# Patient Record
Sex: MALE | Race: BLACK OR AFRICAN AMERICAN | NOT HISPANIC OR LATINO | URBAN - METROPOLITAN AREA
[De-identification: names, ages, dates, MRNs, and addresses within clinical notes are randomized per-mention and may not be internally consistent; named-entity substitution may affect disease eponyms.]

---

## 2017-01-13 ENCOUNTER — OUTPATIENT (OUTPATIENT)
Dept: OUTPATIENT SERVICES | Facility: HOSPITAL | Age: 52
LOS: 1 days | End: 2017-01-13
Payer: MEDICAID

## 2017-01-13 ENCOUNTER — APPOINTMENT (OUTPATIENT)
Dept: NUCLEAR MEDICINE | Facility: HOSPITAL | Age: 52
End: 2017-01-13

## 2017-01-13 DIAGNOSIS — Z96.651 PRESENCE OF RIGHT ARTIFICIAL KNEE JOINT: Chronic | ICD-10-CM

## 2017-01-13 DIAGNOSIS — C64.9 MALIGNANT NEOPLASM OF UNSPECIFIED KIDNEY, EXCEPT RENAL PELVIS: ICD-10-CM

## 2017-01-13 DIAGNOSIS — Z12.11 ENCOUNTER FOR SCREENING FOR MALIGNANT NEOPLASM OF COLON: Chronic | ICD-10-CM

## 2017-01-13 DIAGNOSIS — C77.9 SECONDARY AND UNSPECIFIED MALIGNANT NEOPLASM OF LYMPH NODE, UNSPECIFIED: ICD-10-CM

## 2017-01-13 DIAGNOSIS — C78.00 SECONDARY MALIGNANT NEOPLASM OF UNSPECIFIED LUNG: ICD-10-CM

## 2017-01-13 PROCEDURE — 78306 BONE IMAGING WHOLE BODY: CPT

## 2017-01-13 PROCEDURE — A9503: CPT

## 2017-04-10 ENCOUNTER — APPOINTMENT (OUTPATIENT)
Dept: CT IMAGING | Facility: HOSPITAL | Age: 52
End: 2017-04-10

## 2017-04-10 ENCOUNTER — OUTPATIENT (OUTPATIENT)
Dept: OUTPATIENT SERVICES | Facility: HOSPITAL | Age: 52
LOS: 1 days | End: 2017-04-10
Payer: MEDICAID

## 2017-04-10 DIAGNOSIS — C64.9 MALIGNANT NEOPLASM OF UNSPECIFIED KIDNEY, EXCEPT RENAL PELVIS: ICD-10-CM

## 2017-04-10 DIAGNOSIS — C78.00 SECONDARY MALIGNANT NEOPLASM OF UNSPECIFIED LUNG: ICD-10-CM

## 2017-04-10 DIAGNOSIS — Z96.651 PRESENCE OF RIGHT ARTIFICIAL KNEE JOINT: Chronic | ICD-10-CM

## 2017-04-10 DIAGNOSIS — Z12.11 ENCOUNTER FOR SCREENING FOR MALIGNANT NEOPLASM OF COLON: Chronic | ICD-10-CM

## 2017-04-10 DIAGNOSIS — C77.9 SECONDARY AND UNSPECIFIED MALIGNANT NEOPLASM OF LYMPH NODE, UNSPECIFIED: ICD-10-CM

## 2017-04-10 PROCEDURE — 71260 CT THORAX DX C+: CPT

## 2017-04-10 PROCEDURE — 74177 CT ABD & PELVIS W/CONTRAST: CPT

## 2017-07-17 ENCOUNTER — APPOINTMENT (OUTPATIENT)
Dept: NUCLEAR MEDICINE | Facility: HOSPITAL | Age: 52
End: 2017-07-17

## 2017-07-17 ENCOUNTER — OUTPATIENT (OUTPATIENT)
Dept: OUTPATIENT SERVICES | Facility: HOSPITAL | Age: 52
LOS: 1 days | End: 2017-07-17
Payer: MEDICAID

## 2017-07-17 DIAGNOSIS — C64.9 MALIGNANT NEOPLASM OF UNSPECIFIED KIDNEY, EXCEPT RENAL PELVIS: ICD-10-CM

## 2017-07-17 DIAGNOSIS — Z96.651 PRESENCE OF RIGHT ARTIFICIAL KNEE JOINT: Chronic | ICD-10-CM

## 2017-07-17 DIAGNOSIS — C79.51 SECONDARY MALIGNANT NEOPLASM OF BONE: ICD-10-CM

## 2017-07-17 DIAGNOSIS — Z12.11 ENCOUNTER FOR SCREENING FOR MALIGNANT NEOPLASM OF COLON: Chronic | ICD-10-CM

## 2017-07-17 DIAGNOSIS — C78.00 SECONDARY MALIGNANT NEOPLASM OF UNSPECIFIED LUNG: ICD-10-CM

## 2017-07-17 PROCEDURE — A9503: CPT

## 2017-07-17 PROCEDURE — 78306 BONE IMAGING WHOLE BODY: CPT

## 2017-07-24 ENCOUNTER — APPOINTMENT (OUTPATIENT)
Dept: CT IMAGING | Facility: HOSPITAL | Age: 52
End: 2017-07-24

## 2017-07-24 ENCOUNTER — OUTPATIENT (OUTPATIENT)
Dept: OUTPATIENT SERVICES | Facility: HOSPITAL | Age: 52
LOS: 1 days | End: 2017-07-24
Payer: MEDICAID

## 2017-07-24 DIAGNOSIS — C64.9 MALIGNANT NEOPLASM OF UNSPECIFIED KIDNEY, EXCEPT RENAL PELVIS: ICD-10-CM

## 2017-07-24 DIAGNOSIS — Z96.651 PRESENCE OF RIGHT ARTIFICIAL KNEE JOINT: Chronic | ICD-10-CM

## 2017-07-24 DIAGNOSIS — C78.00 SECONDARY MALIGNANT NEOPLASM OF UNSPECIFIED LUNG: ICD-10-CM

## 2017-07-24 DIAGNOSIS — C79.51 SECONDARY MALIGNANT NEOPLASM OF BONE: ICD-10-CM

## 2017-07-24 DIAGNOSIS — Z12.11 ENCOUNTER FOR SCREENING FOR MALIGNANT NEOPLASM OF COLON: Chronic | ICD-10-CM

## 2017-07-24 PROCEDURE — 74177 CT ABD & PELVIS W/CONTRAST: CPT | Mod: 26

## 2017-07-24 PROCEDURE — 71250 CT THORAX DX C-: CPT | Mod: 26

## 2017-07-24 PROCEDURE — 74177 CT ABD & PELVIS W/CONTRAST: CPT

## 2017-07-24 PROCEDURE — 71250 CT THORAX DX C-: CPT

## 2017-08-23 ENCOUNTER — APPOINTMENT (OUTPATIENT)
Dept: ORTHOPEDIC SURGERY | Facility: CLINIC | Age: 52
End: 2017-08-23
Payer: COMMERCIAL

## 2017-08-23 VITALS — SYSTOLIC BLOOD PRESSURE: 162 MMHG | HEART RATE: 70 BPM | DIASTOLIC BLOOD PRESSURE: 100 MMHG

## 2017-08-23 VITALS — BODY MASS INDEX: 33.6 KG/M2 | WEIGHT: 240 LBS | HEIGHT: 71 IN

## 2017-08-23 DIAGNOSIS — C64.9 MALIGNANT NEOPLASM OF UNSPECIFIED KIDNEY, EXCEPT RENAL PELVIS: ICD-10-CM

## 2017-08-23 PROCEDURE — 99203 OFFICE O/P NEW LOW 30 MIN: CPT

## 2018-04-20 ENCOUNTER — APPOINTMENT (OUTPATIENT)
Dept: CT IMAGING | Facility: HOSPITAL | Age: 53
End: 2018-04-20
Payer: COMMERCIAL

## 2018-04-20 ENCOUNTER — OUTPATIENT (OUTPATIENT)
Dept: OUTPATIENT SERVICES | Facility: HOSPITAL | Age: 53
LOS: 1 days | End: 2018-04-20
Payer: MEDICAID

## 2018-04-20 DIAGNOSIS — Z12.11 ENCOUNTER FOR SCREENING FOR MALIGNANT NEOPLASM OF COLON: Chronic | ICD-10-CM

## 2018-04-20 DIAGNOSIS — C64.9 MALIGNANT NEOPLASM OF UNSPECIFIED KIDNEY, EXCEPT RENAL PELVIS: ICD-10-CM

## 2018-04-20 DIAGNOSIS — Z96.651 PRESENCE OF RIGHT ARTIFICIAL KNEE JOINT: Chronic | ICD-10-CM

## 2018-04-20 PROCEDURE — 74177 CT ABD & PELVIS W/CONTRAST: CPT | Mod: 26

## 2018-04-20 PROCEDURE — 74177 CT ABD & PELVIS W/CONTRAST: CPT

## 2018-05-29 ENCOUNTER — FORM ENCOUNTER (OUTPATIENT)
Age: 53
End: 2018-05-29

## 2018-05-30 ENCOUNTER — APPOINTMENT (OUTPATIENT)
Dept: ENDOVASCULAR SURGERY | Facility: CLINIC | Age: 53
End: 2018-05-30
Payer: COMMERCIAL

## 2018-05-30 PROCEDURE — 36561 INSERT TUNNELED CV CATH: CPT | Mod: LT

## 2018-05-30 PROCEDURE — 76937 US GUIDE VASCULAR ACCESS: CPT

## 2018-05-30 PROCEDURE — 77001 FLUOROGUIDE FOR VEIN DEVICE: CPT

## 2018-06-01 ENCOUNTER — OUTPATIENT (OUTPATIENT)
Dept: OUTPATIENT SERVICES | Facility: HOSPITAL | Age: 53
LOS: 1 days | End: 2018-06-01
Payer: MEDICAID

## 2018-06-01 DIAGNOSIS — Z96.651 PRESENCE OF RIGHT ARTIFICIAL KNEE JOINT: Chronic | ICD-10-CM

## 2018-06-01 DIAGNOSIS — Z12.11 ENCOUNTER FOR SCREENING FOR MALIGNANT NEOPLASM OF COLON: Chronic | ICD-10-CM

## 2018-06-01 PROCEDURE — G9001: CPT

## 2018-06-06 ENCOUNTER — APPOINTMENT (OUTPATIENT)
Dept: RADIOLOGY | Facility: HOSPITAL | Age: 53
End: 2018-06-06
Payer: COMMERCIAL

## 2018-06-06 ENCOUNTER — OUTPATIENT (OUTPATIENT)
Dept: OUTPATIENT SERVICES | Facility: HOSPITAL | Age: 53
LOS: 1 days | End: 2018-06-06

## 2018-06-06 ENCOUNTER — APPOINTMENT (OUTPATIENT)
Dept: SPEECH THERAPY | Facility: HOSPITAL | Age: 53
End: 2018-06-06
Payer: COMMERCIAL

## 2018-06-06 ENCOUNTER — OUTPATIENT (OUTPATIENT)
Dept: OUTPATIENT SERVICES | Facility: HOSPITAL | Age: 53
LOS: 1 days | Discharge: HOME | End: 2018-06-06

## 2018-06-06 DIAGNOSIS — Z12.11 ENCOUNTER FOR SCREENING FOR MALIGNANT NEOPLASM OF COLON: Chronic | ICD-10-CM

## 2018-06-06 DIAGNOSIS — R13.12 DYSPHAGIA, OROPHARYNGEAL PHASE: ICD-10-CM

## 2018-06-06 DIAGNOSIS — R13.10 DYSPHAGIA, UNSPECIFIED: ICD-10-CM

## 2018-06-06 DIAGNOSIS — Z96.651 PRESENCE OF RIGHT ARTIFICIAL KNEE JOINT: Chronic | ICD-10-CM

## 2018-06-06 PROCEDURE — 74230 X-RAY XM SWLNG FUNCJ C+: CPT | Mod: 26

## 2018-06-18 DIAGNOSIS — R69 ILLNESS, UNSPECIFIED: ICD-10-CM

## 2018-07-01 ENCOUNTER — OUTPATIENT (OUTPATIENT)
Dept: OUTPATIENT SERVICES | Facility: HOSPITAL | Age: 53
LOS: 1 days | End: 2018-07-01

## 2018-07-01 DIAGNOSIS — Z12.11 ENCOUNTER FOR SCREENING FOR MALIGNANT NEOPLASM OF COLON: Chronic | ICD-10-CM

## 2018-07-01 DIAGNOSIS — Z96.651 PRESENCE OF RIGHT ARTIFICIAL KNEE JOINT: Chronic | ICD-10-CM

## 2018-07-23 DIAGNOSIS — Z71.89 OTHER SPECIFIED COUNSELING: ICD-10-CM

## 2018-08-02 ENCOUNTER — INPATIENT (INPATIENT)
Facility: HOSPITAL | Age: 53
LOS: 4 days | Discharge: NURSING, REHAB OR LONG-TERM CARE | DRG: 686 | End: 2018-08-07
Attending: STUDENT IN AN ORGANIZED HEALTH CARE EDUCATION/TRAINING PROGRAM | Admitting: STUDENT IN AN ORGANIZED HEALTH CARE EDUCATION/TRAINING PROGRAM
Payer: COMMERCIAL

## 2018-08-02 VITALS
SYSTOLIC BLOOD PRESSURE: 94 MMHG | OXYGEN SATURATION: 95 % | TEMPERATURE: 98 F | HEART RATE: 90 BPM | DIASTOLIC BLOOD PRESSURE: 62 MMHG | RESPIRATION RATE: 25 BRPM

## 2018-08-02 DIAGNOSIS — Z91.89 OTHER SPECIFIED PERSONAL RISK FACTORS, NOT ELSEWHERE CLASSIFIED: ICD-10-CM

## 2018-08-02 DIAGNOSIS — C64.1 MALIGNANT NEOPLASM OF RIGHT KIDNEY, EXCEPT RENAL PELVIS: ICD-10-CM

## 2018-08-02 DIAGNOSIS — S72.90XA UNSPECIFIED FRACTURE OF UNSPECIFIED FEMUR, INITIAL ENCOUNTER FOR CLOSED FRACTURE: Chronic | ICD-10-CM

## 2018-08-02 DIAGNOSIS — Z96.653 PRESENCE OF ARTIFICIAL KNEE JOINT, BILATERAL: Chronic | ICD-10-CM

## 2018-08-02 DIAGNOSIS — R63.8 OTHER SYMPTOMS AND SIGNS CONCERNING FOOD AND FLUID INTAKE: ICD-10-CM

## 2018-08-02 DIAGNOSIS — I95.9 HYPOTENSION, UNSPECIFIED: ICD-10-CM

## 2018-08-02 DIAGNOSIS — Z12.11 ENCOUNTER FOR SCREENING FOR MALIGNANT NEOPLASM OF COLON: Chronic | ICD-10-CM

## 2018-08-02 DIAGNOSIS — Z96.651 PRESENCE OF RIGHT ARTIFICIAL KNEE JOINT: Chronic | ICD-10-CM

## 2018-08-02 DIAGNOSIS — R56.9 UNSPECIFIED CONVULSIONS: ICD-10-CM

## 2018-08-02 DIAGNOSIS — D64.9 ANEMIA, UNSPECIFIED: ICD-10-CM

## 2018-08-02 LAB
ALBUMIN SERPL ELPH-MCNC: 1.8 G/DL — LOW (ref 3.3–5)
ALP SERPL-CCNC: 354 U/L — HIGH (ref 40–120)
ALT FLD-CCNC: <5 U/L — LOW (ref 10–45)
ANION GAP SERPL CALC-SCNC: 16 MMOL/L — SIGNIFICANT CHANGE UP (ref 5–17)
APTT BLD: 40.7 SEC — HIGH (ref 27.5–37.4)
AST SERPL-CCNC: 8 U/L — LOW (ref 10–40)
BASOPHILS NFR BLD AUTO: 0.1 % — SIGNIFICANT CHANGE UP (ref 0–2)
BILIRUB SERPL-MCNC: 0.8 MG/DL — SIGNIFICANT CHANGE UP (ref 0.2–1.2)
BLD GP AB SCN SERPL QL: NEGATIVE — SIGNIFICANT CHANGE UP
BUN SERPL-MCNC: 11 MG/DL — SIGNIFICANT CHANGE UP (ref 7–23)
CALCIUM SERPL-MCNC: 7.8 MG/DL — LOW (ref 8.4–10.5)
CHLORIDE SERPL-SCNC: 98 MMOL/L — SIGNIFICANT CHANGE UP (ref 96–108)
CO2 SERPL-SCNC: 23 MMOL/L — SIGNIFICANT CHANGE UP (ref 22–31)
CREAT SERPL-MCNC: 0.68 MG/DL — SIGNIFICANT CHANGE UP (ref 0.5–1.3)
EOSINOPHIL NFR BLD AUTO: 2.4 % — SIGNIFICANT CHANGE UP (ref 0–6)
FERRITIN SERPL-MCNC: 3794 NG/ML — HIGH (ref 30–400)
GLUCOSE SERPL-MCNC: 84 MG/DL — SIGNIFICANT CHANGE UP (ref 70–99)
HCT VFR BLD CALC: 26 % — LOW (ref 39–50)
HGB BLD-MCNC: 7.8 G/DL — LOW (ref 13–17)
INR BLD: 1.61 — HIGH (ref 0.88–1.16)
IRON SATN MFR SERPL: 20 UG/DL — LOW (ref 45–165)
IRON SATN MFR SERPL: 29 % — SIGNIFICANT CHANGE UP (ref 16–55)
LYMPHOCYTES # BLD AUTO: 10.4 % — LOW (ref 13–44)
MAGNESIUM SERPL-MCNC: 1.8 MG/DL — SIGNIFICANT CHANGE UP (ref 1.6–2.6)
MCHC RBC-ENTMCNC: 26.4 PG — LOW (ref 27–34)
MCHC RBC-ENTMCNC: 30 G/DL — LOW (ref 32–36)
MCV RBC AUTO: 87.8 FL — SIGNIFICANT CHANGE UP (ref 80–100)
MONOCYTES NFR BLD AUTO: 10.2 % — SIGNIFICANT CHANGE UP (ref 2–14)
NEUTROPHILS NFR BLD AUTO: 76.9 % — SIGNIFICANT CHANGE UP (ref 43–77)
PLATELET # BLD AUTO: 318 K/UL — SIGNIFICANT CHANGE UP (ref 150–400)
POTASSIUM SERPL-MCNC: 4.1 MMOL/L — SIGNIFICANT CHANGE UP (ref 3.5–5.3)
POTASSIUM SERPL-SCNC: 4.1 MMOL/L — SIGNIFICANT CHANGE UP (ref 3.5–5.3)
PROT SERPL-MCNC: 7.1 G/DL — SIGNIFICANT CHANGE UP (ref 6–8.3)
PROTHROM AB SERPL-ACNC: 18.1 SEC — HIGH (ref 9.8–12.7)
RBC # BLD: 2.96 M/UL — LOW (ref 4.2–5.8)
RBC # FLD: 15.2 % — SIGNIFICANT CHANGE UP (ref 10.3–16.9)
RH IG SCN BLD-IMP: POSITIVE — SIGNIFICANT CHANGE UP
SODIUM SERPL-SCNC: 137 MMOL/L — SIGNIFICANT CHANGE UP (ref 135–145)
TIBC SERPL-MCNC: 70 UG/DL — LOW (ref 220–430)
TSH SERPL-MCNC: 2.76 UIU/ML — SIGNIFICANT CHANGE UP (ref 0.35–4.94)
UIBC SERPL-MCNC: 50 UG/DL — LOW (ref 110–370)
WBC # BLD: 8.7 K/UL — SIGNIFICANT CHANGE UP (ref 3.8–10.5)
WBC # FLD AUTO: 8.7 K/UL — SIGNIFICANT CHANGE UP (ref 3.8–10.5)

## 2018-08-02 PROCEDURE — 99223 1ST HOSP IP/OBS HIGH 75: CPT | Mod: GC

## 2018-08-02 PROCEDURE — 71045 X-RAY EXAM CHEST 1 VIEW: CPT | Mod: 26

## 2018-08-02 PROCEDURE — 99285 EMERGENCY DEPT VISIT HI MDM: CPT

## 2018-08-02 PROCEDURE — 70450 CT HEAD/BRAIN W/O DYE: CPT | Mod: 26

## 2018-08-02 RX ORDER — ACETAMINOPHEN 500 MG
1000 TABLET ORAL ONCE
Qty: 0 | Refills: 0 | Status: COMPLETED | OUTPATIENT
Start: 2018-08-02 | End: 2018-08-02

## 2018-08-02 RX ORDER — MORPHINE SULFATE 50 MG/1
4 CAPSULE, EXTENDED RELEASE ORAL ONCE
Qty: 0 | Refills: 0 | Status: DISCONTINUED | OUTPATIENT
Start: 2018-08-02 | End: 2018-08-02

## 2018-08-02 RX ORDER — SODIUM CHLORIDE 9 MG/ML
1000 INJECTION INTRAMUSCULAR; INTRAVENOUS; SUBCUTANEOUS ONCE
Qty: 0 | Refills: 0 | Status: COMPLETED | OUTPATIENT
Start: 2018-08-02 | End: 2018-08-02

## 2018-08-02 RX ORDER — MORPHINE SULFATE 50 MG/1
2 CAPSULE, EXTENDED RELEASE ORAL EVERY 4 HOURS
Qty: 0 | Refills: 0 | Status: DISCONTINUED | OUTPATIENT
Start: 2018-08-03 | End: 2018-08-03

## 2018-08-02 RX ADMIN — Medication 1000 MILLIGRAM(S): at 20:56

## 2018-08-02 RX ADMIN — Medication 400 MILLIGRAM(S): at 15:18

## 2018-08-02 RX ADMIN — Medication 400 MILLIGRAM(S): at 20:14

## 2018-08-02 RX ADMIN — MORPHINE SULFATE 4 MILLIGRAM(S): 50 CAPSULE, EXTENDED RELEASE ORAL at 21:29

## 2018-08-02 RX ADMIN — SODIUM CHLORIDE 1000 MILLILITER(S): 9 INJECTION INTRAMUSCULAR; INTRAVENOUS; SUBCUTANEOUS at 13:42

## 2018-08-02 RX ADMIN — Medication 1000 MILLIGRAM(S): at 16:00

## 2018-08-02 NOTE — H&P ADULT - NSHPLABSRESULTS_GEN_ALL_CORE
(08-02 @ 13:43)                      7.8  8.7 )-----------( 318                 26.0    Neutrophils = -- (76.9%)  Lymphocytes = -- (10.4%)  Eosinophils = -- (2.4%)  Basophils = -- (0.1%)  Monocytes = -- (10.2%)  Bands = --%    08-02    137  |  98  |  11  ----------------------------<  84  4.1   |  23  |  0.68    Ca    7.8<L>      02 Aug 2018 13:43  Mg     1.8     08-02    TPro  7.1  /  Alb  1.8<L>  /  TBili  0.8  /  DBili  x   /  AST  8<L>  /  ALT  <5<L>  /  AlkPhos  354<H>  08-02    ( 02 Aug 2018 13:43 )   PT: 18.1 sec;   INR: 1.61 ;       PTT:40.7 sec    EKG: normal sinus rhythm     CT head: nonspecific patchy areas of low attenuation in the cerebral white matter. There is no acute intracranial hemorrhage, midline shift, mass   effect or extracerebral collection, cerebral volume loss

## 2018-08-02 NOTE — ED PROVIDER NOTE - OBJECTIVE STATEMENT
53 year old male with history of metastatic renal carcinoma presents to ED via EMS transport with concern for hypotension during chemotherapy today.  Patient is awake and alert on ED arrival, able to provide his own history.  He notes ongoing bilateral hip discomfort which he attributes to osteoarthiritis, however is asymptomatic otherwise.  He denies complaints of lightheadedness, fever, chills, chest pain, nausea, vomiting, abdominal pain or any additional acute complaints or concerns at this time. 53 year old male with history of metastatic renal carcinoma presents to ED via EMS transport with concern for hypotension during chemotherapy today.  Patient is awake and alert on ED arrival, able to provide his own history.  He notes ongoing bilateral hip discomfort which he attributes to osteoarthritis, however is asymptomatic otherwise.  He denies complaints of lightheadedness, fever, chills, chest pain, nausea, vomiting, abdominal pain or any additional acute complaints or concerns at this time.

## 2018-08-02 NOTE — ED PROVIDER NOTE - MEDICAL DECISION MAKING DETAILS
patient in ED sent by PCP for admission.  Patient with history of renal cell carcinoma and seizure last week.  PCP notes brain mets and is concerned for no prior work up for the seizure last week.  She also feels care has been suboptimal at SNF as per ED note sent with patient.  Labs and imaging reviewed.  Hgb noted.  Patient given 1 L IVF and ICU in ED to evaluate for possible tele admission.  ICU does not feel patient requires tele admit at this time as his BP is stable.  Recs made for possible transfusion of pRBCs on admission.  Will relay this rec to admitting medicine team.  Patient aware of plan for admission and agreeable.

## 2018-08-02 NOTE — H&P ADULT - PROBLEM SELECTOR PLAN 3
CT head showing nonspecific patchy areas of low attenuation in the cerebral white matter.  History of RCC w/ mets to brain, s/p WBRT in August-Sept. 2017.  No significant electrolyte abnormalities on admission. BP 94/62 in ED. s/p 1 L NS. Per patient, BP usually runs low in SNF.   No signs of sepsis at this time: patient afebrile, WBC WNL    Transfuse 1 unit pRBCs and monitor BP. BP 94/62 in ED. s/p 1 L NS and 1 unit pRBCs. Per patient, BP usually runs low in SNF.   No signs of sepsis at this time: patient afebrile, WBC WNL    Monitor BP. Give IV fluids if hypotensive.   Obtain orthostatic vitals.

## 2018-08-02 NOTE — H&P ADULT - HISTORY OF PRESENT ILLNESS
53 year old male with history of metastatic RCC (diagnosed in 2016; mets to brain, lungs, femur bilaterally), currently living in SNF who present to [facility] ED from chemo center at the urge of his oncologist.    Patient was diagnosed with his RCC 2 years ago and has been treated with radiation to the brain in 2017 and has been back on chemo with Nivolumab every 2 weeks since May 2018.    Patient reports he has also been mostly bedbound because he had bilateral femoral surgeries in november 2017 at [facility] for bone mets w/ pathological fractures. He has been living in a SNF.    Patient reports this Sunday, 7/28/18, he was talking to this friend when he suddenly began twitching his RUE and LOC. Patient is unclear of how long he was out but endorses no tongue biting or urinary loss after episode. Patient has never had a seizure before.  Patient was not sent to a hospital. Patient when hospitalized usually goes to [facility] or [facility]. This past Tuesday was transfused 2 units of pRBCs by his oncologist for worsening anemia. Patient followed up with is oncologist (Dr. Grigsby) today for nivolomab infusion. However, his oncologist  did not feel comfortable giving him chemo and sent him to [facility] for further workup regarding seizure with concern that  he is getting substandard care at his facility and for "Hope that he can be enrolled in hospice."    In ER, patient's initial vitals were  T98.2, HR 90, BP 94/62, R 25, SpO2 95% RA. His only acute complaint was severe left hip pain, which he says is chronic and gets Tylenol at his SNF. He reports 200 pound weight loss since he was diagnosed, anorexia and chronic femur pain. ROS is negative for fevers, chills, headache, visual changes, SOB, cough, chest pain, runny nose, sore throat, abdominal pain, N/V/D or changes in urinary habits. Denies any hematochezia, hematemesis, melena, hematuria, or other signs of external bleeding. Reports having low blood pressure readings in the past few months 53 year old male with history of metastatic RCC (diagnosed in 2016; mets to brain, lungs, femur bilaterally), currently living in SNF who present to [facility] ED from chemo center at the urge of his oncologist.    Patient was diagnosed with his RCC 2 years ago and has been treated with radiation to the brain in 2017 and has been back on chemo with Nivolumab every 2 weeks since May 2018.    Patient reports he has also been mostly bedbound because he had bilateral femoral surgeries in november 2017 at [facility] for bone mets w/ pathological fractures. He has been living in a SNF.    Patient reports this Sunday, 7/28/18, he was talking to this friend when he suddenly began twitching in his RUE and was followed LOC. Patient is unclear of how long he was out but endorses no tongue biting or urinary loss after episode. Episode was unwitnessed, and patient unable to provide further details about event. Patient has never had a seizure before.  Patient was not sent to a hospital.     Patient when hospitalized usually goes to [place] or [facility]. This past Tuesday was transfused 2 units of pRBCs by his oncologist for worsening anemia. Patient followed up with is oncologist (Dr. Garcia) today for nivolomab infusion. However, his oncologist  did not feel comfortable giving him chemo and sent him to [facility] for further workup regarding seizure with concern that  he is getting substandard care at his facility and for "Hope that he can be enrolled in hospice."    In ER, patient's initial vitals were  T98.2, HR 90, BP 94/62, R 25, SpO2 95% RA. His only acute complaint was severe left hip pain, which he says is chronic and gets Tylenol at his SNF. He reports 200 pound weight loss since he was diagnosed, anorexia and chronic femur pain. ROS is negative for fevers, chills, headache, visual changes, SOB, cough, chest pain, runny nose, sore throat, abdominal pain, N/V/D or changes in urinary habits. Denies any hematochezia, hematemesis, melena, hematuria, or other signs of external bleeding. Reports having low blood pressure readings in the past few months 53 year old male with history of metastatic RCC (diagnosed in 2016; mets to brain, lungs, femur bilaterally), currently living in SNF who present to [facility] ED from chemo center at the urge of his oncologist.    Patient was diagnosed with his RCC 2 years ago and has been treated with radiation to the brain in 2017 and has been back on chemo with Nivolumab every 2 weeks since May 2018.    Patient reports he has also been mostly bedbound because he had bilateral femoral surgeries in november 2017 at [facility] for bone mets w/ pathological fractures. He has been living in a SNF.    Patient reports this Sunday, 7/28/18, he was talking to this friend when he suddenly began twitching in his RUE and was followed LOC. Patient is unclear of how long he was out but endorses no tongue biting or urinary loss after episode. Episode was unwitnessed, and patient unable to provide further details about event. Patient has never had a seizure before.  Patient was not sent to a hospital.     Patient when hospitalized usually goes to [place] or [facility]. This past Tuesday was transfused 2 units of pRBCs by his oncologist for worsening anemia. Patient followed up with is oncologist (Dr. Garcia) today for nivolomab infusion. However, his oncologist  did not feel comfortable giving him chemo and sent him to [facility] for further workup regarding seizure with concern that  he is getting substandard care at his facility and for "Hope that he can be enrolled in hospice."    In ER, patient's initial vitals were  T98.2, HR 90, BP 94/62, R 25, SpO2 95% RA. His only acute complaint was severe left hip pain, which he says is chronic and gets Tylenol at his SNF. He reports 200 pound weight loss since he was diagnosed, anorexia and chronic femur pain. ROS is negative for fevers, chills, headache, visual changes, SOB, cough, chest pain, runny nose, sore throat, abdominal pain, N/V/D or changes in urinary habits. Denies any hematochezia, hematemesis, melena, hematuria, or other signs of external bleeding. Reports having low blood pressure readings in the past few months     FH: Denies cardiac history in father

## 2018-08-02 NOTE — H&P ADULT - NSHPPHYSICALEXAM_GEN_ALL_CORE
GENERAL: Thin and cachectic. No acute distress. Appears stated age.  HEENT:  Atraumatic, Normocephalic,  extraocular eye movements intact, PERRLA, conjunctiva pallor bilaterally, oropharynx clear without exudates or erythema  NECK: Supple, No JVD, no LAD  CHEST: port in place in left chest   LUNG: Clear to auscultation bilaterally; No wheezing, rales, rhonchi, or stridor  HEART: Regular rate and rhythm; S1 and S2 audible; No murmurs, rubs, or gallops  ABDOMEN: Soft, Nontender, Nondistended; Bowel sounds present in all four quadrants; no hepatosplenomegaly  EXTREMITIES:  2+ Peripheral Pulses bilaterally, No clubbing, cyanosis. Trace edema in LLE.   PSYCH: AAOx3  NEUROLOGY: CNs II-XII intact; strength 5/5 in bilateral UEs and RLE; unable to assess LLE strength due to severe pain  SKIN: No rashes or lesions

## 2018-08-02 NOTE — H&P ADULT - NSHPSOCIALHISTORY_GEN_ALL_CORE
Denies current smoking, alcohol, or illicit drug use. Previously used alcohol heavily.   Lives in a SNF. Has 2 daughters.

## 2018-08-02 NOTE — H&P ADULT - ASSESSMENT
53 year old male with history of metastatic RCC (diagnosed in 2016; mets to brain, lungs, femur bilaterally), currently living in SNF who present to [facility] ED from [facility] center at the urge of his oncologist after recent seizure-like episode. Found to have borderline low BP in 90s/60s but stable, and anemia at 7.8 which appears to be near baseline with no signs of bleeding, though patient gets frequent transfusions. Given anemia and low BP, patient will be stabilized and transfused, and seizure work-up pending.

## 2018-08-02 NOTE — ED PROVIDER NOTE - CONSTITUTIONAL, MLM
normal... Thin, frail appearing, awake, alert, oriented to person, place, time/situation and in no apparent distress.

## 2018-08-02 NOTE — H&P ADULT - PROBLEM SELECTOR PLAN 6
Will update patient's outpatient providers on discharge. Code: Full  DVT ppx: SCDs; hold heparin due to anemia   GI ppx: pantoprazole  Dispo: admit to [facility] F: s/p 1 L NS and 1 unit pRBCs. Will get more fluids if hypotensive  E: no significant electrolyte abnormalities  N: regular diet; consult nutrition services     Code: Full  DVT ppx: enoxaparin SQ  GI ppx: pantoprazole  Dispo: admit to [facility]

## 2018-08-02 NOTE — CONSULT NOTE ADULT - SUBJECTIVE AND OBJECTIVE BOX
[facility] SERVICE CONSULTATION NOTE    CC:    HPI: Patient is a gentleman with a pmhx of metastatic RCC (diagnosed in 2016; mets to brain, lungs, femur bilaterally) who is here presenting from chemo center at the urge of his oncologist since oncologist feels he is getting substandard care at his facility. Patient reports he was diagnosed with his RCC 2 years ago and has been treated with radiation to the brain in 2017 and has been back on chemo since may every 2 weeks. Patient reports he has also been mostly bedbound since he had bilateral femoral surgeries in november for bone marrow spread. Patient reports this sunday he was talking to this friend when he suddenly began twitching his RUE and LOC. Patient is unclear of how long he was out but endorses no tongue biting or urinary loss after episode. Patient has never had a seizure before.  Patient was not sent to a hospital. Patient when hospitalized usually goes to [facility] or [facility]. This past Tuesday he was told his blood counts are low and was given 2 U of PRBC. Patient followed up with is oncologist today for chemo however Dr. Garcia did not feel comfortable giving him chemo and sent him to [facility] for further workup regarding seizure and for "Hope that he can be enrolled in hospice".  Patient currently has no acute complaints. ROS is positive for almost 200 pound weight loss since he was diagnosed, anorexia and chronic femur pain.  2 weeks ago. ROS is negative for fevers, chill, Headache, SOB, Cough, runny nose, sore throat, abdominal pain, N/V/D or changes in urinary habits .   PMH: see above; had HTN in the past    PSH: bilateral femur and knee replacements    SH: doesnt smoke cigarettes, drink alcohol or use     ALLERGIES: none    MEDICATIONS: unclear but takes nivolumab for chemio    VITAL SIGNS:  ICU Vital Signs Last 24 Hrs  T(C): 36.5 (02 Aug 2018 13:58), Max: 36.8 (02 Aug 2018 12:59)  T(F): 97.7 (02 Aug 2018 13:58), Max: 98.2 (02 Aug 2018 12:59)  HR: 90 (02 Aug 2018 15:24) (88 - 90)  BP: 89/55 (02 Aug 2018 15:24) (88/61 - 94/62)  BP(mean): --  ABP: --  ABP(mean): --  RR: 24 (02 Aug 2018 13:58) (24 - 25)  SpO2: 95% (02 Aug 2018 13:58) (95% - 95%)    CAPILLARY BLOOD GLUCOSE          PHYSICAL EXAM:  Constitutional: resting comfortably in bed, NAD, hollowed out cheeks  HEENT: NC/AT; PERRL, anicteric sclera; no oropharyngeal erythema or exudates; dry MM; conjunctivial pallor   Neck: supple, no appreciable JVD, port seen in place   Respiratory: CTA B/L, no W/R/R; respirations appear non-labored, conversive in full sentences  Cardiovascular: +S1/S2, RRR  Gastrointestinal: abdomen soft, NT/ND  Extremities: LLE edema 1+ increased as compared to the right   Vascular: 2+ radial and 1+ DP pulses B/L  Dermatologic: skin normal color and turgor; no visible rashes  Neurological: AAOX3, SAVEAHAART exam for attention normal; reflexes    in patellar and brachiradialis; no dysdidochokinesis;   LABS:                        7.8    8.7   )-----------( 318      ( 02 Aug 2018 13:43 )             26.0     08-02    137  |  98  |  11  ----------------------------<  84  4.1   |  23  |  0.68    Ca    7.8<L>      02 Aug 2018 13:43  Mg     1.8     08-02    TPro  7.1  /  Alb  1.8<L>  /  TBili  0.8  /  DBili  x   /  AST  8<L>  /  ALT  <5<L>  /  AlkPhos  354<H>  08-02    PT/INR - ( 02 Aug 2018 13:43 )   PT: 18.1 sec;   INR: 1.61          PTT - ( 02 Aug 2018 13:43 )  PTT:40.7 sec              EKG: NSR with no ST-T wave changes   RADIOLOGY & ADDITIONAL TESTS: CXR WNL       Patient is [facility] SERVICE CONSULTATION NOTE    CC:    HPI: Patient is a gentleman with a pmhx of metastatic RCC (diagnosed in 2016; mets to brain, lungs, femur bilaterally) who is here presenting from chemo center at the urge of his oncologist since oncologist feels he is getting substandard care at his facility. Patient reports he was diagnosed with his RCC 2 years ago and has been treated with radiation to the brain in 2017 and has been back on chemo since may every 2 weeks. Patient reports he has also been mostly bedbound since he had bilateral femoral surgeries in november for bone marrow spread. Patient reports this sunday he was talking to this friend when he suddenly began twitching his RUE and LOC. Patient is unclear of how long he was out but endorses no tongue biting or urinary loss after episode. Patient has never had a seizure before.  Patient was not sent to a hospital. Patient when hospitalized usually goes to [facility] or [facility]. This past Tuesday he was told his blood counts are low and was given 2 U of PRBC. Patient followed up with is oncologist today for chemo however Dr. Garcia did not feel comfortable giving him chemo and sent him to [facility] for further workup regarding seizure and for "Hope that he can be enrolled in hospice".  Patient currently has no acute complaints. ROS is positive for almost 200 pound weight loss since he was diagnosed, anorexia and chronic femur pain.  2 weeks ago. ROS is negative for fevers, chill, Headache, SOB, Cough, runny nose, sore throat, abdominal pain, N/V/D or changes in urinary habits .   PMH: see above; had HTN in the past    PSH: bilateral femur and knee replacements    SH: doesnt smoke cigarettes, drink alcohol or use     ALLERGIES: none    MEDICATIONS: unclear but takes nivolumab for chemio    VITAL SIGNS:  ICU Vital Signs Last 24 Hrs  T(C): 36.5 (02 Aug 2018 13:58), Max: 36.8 (02 Aug 2018 12:59)  T(F): 97.7 (02 Aug 2018 13:58), Max: 98.2 (02 Aug 2018 12:59)  HR: 90 (02 Aug 2018 15:24) (88 - 90)  BP: 89/55 (02 Aug 2018 15:24) (88/61 - 94/62)  BP(mean): --  ABP: --  ABP(mean): --  RR: 24 (02 Aug 2018 13:58) (24 - 25)  SpO2: 95% (02 Aug 2018 13:58) (95% - 95%)    CAPILLARY BLOOD GLUCOSE          PHYSICAL EXAM:  Constitutional: resting comfortably in bed, NAD, hollowed out cheeks  HEENT: NC/AT; PERRL, anicteric sclera; no oropharyngeal erythema or exudates; dry MM; conjunctivial pallor   Neck: supple, no appreciable JVD, port seen in place   Respiratory: CTA B/L, no W/R/R; respirations appear non-labored, conversive in full sentences  Cardiovascular: +S1/S2, RRR  Gastrointestinal: abdomen soft, NT/ND  Extremities: LLE edema 1+ increased as compared to the right   Vascular: 2+ radial and 1+ DP pulses B/L  Dermatologic: skin normal color and turgor; no visible rashes  Neurological: AAOX3, SAVEAHAART exam for attention normal; reflexes    in patellar and brachiradialis; no dysdidochokinesis;   LABS:                        7.8    8.7   )-----------( 318      ( 02 Aug 2018 13:43 )             26.0     08-02    137  |  98  |  11  ----------------------------<  84  4.1   |  23  |  0.68    Ca    7.8<L>      02 Aug 2018 13:43  Mg     1.8     08-02    TPro  7.1  /  Alb  1.8<L>  /  TBili  0.8  /  DBili  x   /  AST  8<L>  /  ALT  <5<L>  /  AlkPhos  354<H>  08-02    PT/INR - ( 02 Aug 2018 13:43 )   PT: 18.1 sec;   INR: 1.61          PTT - ( 02 Aug 2018 13:43 )  PTT:40.7 sec              EKG: NSR with no ST-T wave changes   RADIOLOGY & ADDITIONAL TESTS: CXR WNL       Patient is a 52 yo male with a pmhx of metastatic RCC who was sent at the urge of his doctor for seizure workup. ICU was consulted for relative hypotension. patient clinically without complaints and denies dizziness or blurry vision; PE notable for dry MM and cacheixia with LLE swelling;  relative hypotension likely secondary to hypovolemia secondary to poor PO Intake and dehydration; no signs of carfiogenic shock on exam  -- check iron studies   --check orthostatics  -- trend H/H; hgb seems to be anemia of chronic disease in the setting of malignancy and NO complaints of epistaxis, melena, hematemesis or hematochezia  -- check TSH for hypothyroidism  -- consider giving NS bolus as patient clinically dry  -- consider palliative consult.   -- seizure workup as per primary medicine team [facility] SERVICE CONSULTATION NOTE    CC:    HPI: Patient is a gentleman with a pmhx of metastatic RCC (diagnosed in 2016; mets to brain, lungs, femur bilaterally) who is here presenting from chemo center at the urge of his oncologist since oncologist feels he is getting substandard care at his facility. Patient reports he was diagnosed with his RCC 2 years ago and has been treated with radiation to the brain in 2017 and has been back on chemo since may every 2 weeks. Patient reports he has also been mostly bedbound since he had bilateral femoral surgeries in november for bone marrow spread. Patient reports this sunday he was talking to this friend when he suddenly began twitching his RUE and LOC. Patient is unclear of how long he was out but endorses no tongue biting or urinary loss after episode. Patient has never had a seizure before.  Patient was not sent to a hospital. Patient when hospitalized usually goes to [facility] [place] or [facility] [place]. This past Tuesday he was told his blood counts are low and was given 2 U of PRBC. Patient followed up with is oncologist today for chemo however Dr. Garcia did not feel comfortable giving him chemo and sent him to [facility] for further workup regarding seizure and for "Hope that he can be enrolled in hospice".  Patient currently has no acute complaints. ROS is positive for almost 200 pound weight loss since he was diagnosed, anorexia and chronic femur pain.  2 weeks ago. ROS is negative for fevers, chill, Headache, SOB, Cough, runny nose, sore throat, abdominal pain, N/V/D or changes in urinary habits. Reports having low blood pressure readings in the past few months   PMH: see above; had HTN in the past    PSH: bilateral femur and knee replacements    SH: doesnt smoke cigarettes, drink alcohol or use; has 2 daughters    ALLERGIES: none    MEDICATIONS: unclear but takes nivolumab for chemo and tylenol for pain    VITAL SIGNS:  ICU Vital Signs Last 24 Hrs  T(C): 36.5 (02 Aug 2018 13:58), Max: 36.8 (02 Aug 2018 12:59)  T(F): 97.7 (02 Aug 2018 13:58), Max: 98.2 (02 Aug 2018 12:59)  HR: 90 (02 Aug 2018 15:24) (88 - 90)  BP: 89/55 (02 Aug 2018 15:24) (88/61 - 94/62)  BP(mean): --  ABP: --  ABP(mean): --  RR: 24 (02 Aug 2018 13:58) (24 - 25)  SpO2: 95% (02 Aug 2018 13:58) (95% - 95%)    CAPILLARY BLOOD GLUCOSE          PHYSICAL EXAM:  Constitutional: resting comfortably in bed, NAD, hollowed out cheeks  HEENT: NC/AT; PERRL, anicteric sclera; no oropharyngeal erythema or exudates; dry MM; conjunctivial pallor   Neck: supple, no appreciable JVD, port seen in place   Respiratory: CTA B/L, no W/R/R; respirations appear non-labored, conversive in full sentences  Cardiovascular: +S1/S2, RRR  Gastrointestinal: abdomen soft, NT/ND  Extremities: LLE edema 1+ increased as compared to the right   Vascular: 2+ radial and 1+ DP pulses B/L  Dermatologic: skin normal color and turgor; no visible rashes  Neurological: AAOX3, SAVEAHAART exam for attention normal; reflexes    in patellar and brachiradialis; no dysdidochokinesis;   LABS:                        7.8    8.7   )-----------( 318      ( 02 Aug 2018 13:43 )             26.0     08-02    137  |  98  |  11  ----------------------------<  84  4.1   |  23  |  0.68    Ca    7.8<L>      02 Aug 2018 13:43  Mg     1.8     08-02    TPro  7.1  /  Alb  1.8<L>  /  TBili  0.8  /  DBili  x   /  AST  8<L>  /  ALT  <5<L>  /  AlkPhos  354<H>  08-02    PT/INR - ( 02 Aug 2018 13:43 )   PT: 18.1 sec;   INR: 1.61          PTT - ( 02 Aug 2018 13:43 )  PTT:40.7 sec              EKG: NSR with no ST-T wave changes   RADIOLOGY & ADDITIONAL TESTS: CXR WNL       A/P: Patient is a 54 yo male with a pmhx of metastatic RCC who was sent at the urge of his doctor for seizure workup. ICU was consulted for relative hypotension. Patient clinically without complaints and denies dizziness or blurry vision; PE notable for dry MM, conjunctival pallor and cachexia with LLE swelling; relative hypotension likely secondary to hypovolemia secondary to poor PO Intake and dehydration with underlying anemia compounding issue ; no signs of cardiogenic shock on exam; Well score 2.5 for PE; no right heart strain noted on EKG; denies being on any steroids recently at least not since 2018 started    -- check iron studies before giving 1 unit of PRBC as per attending  -- trend H/H post transfusion; hgb seems to be anemia of chronic disease in the setting of malignancy and NO complaints of epistaxis, melena, hematemesis or hematochezia  --check orthostatics  -- check TSH for hypothyroidism  -- consider palliative consult although patient is currently FULL CODE and was confirmed multiple times   -- seizure workup as per primary medicine team [facility] SERVICE CONSULTATION NOTE    CC:    HPI: Patient is a gentleman with a pmhx of metastatic RCC (diagnosed in 2016; mets to brain, lungs, femur bilaterally) who is here presenting from chemo center at the urge of his oncologist since oncologist feels he is getting substandard care at his facility. Patient reports he was diagnosed with his RCC 2 years ago and has been treated with radiation to the brain in 2017 and has been back on chemo since may every 2 weeks. Patient reports he has also been mostly bedbound since he had bilateral femoral surgeries in november for bone marrow spread. Patient reports this sunday he was talking to this friend when he suddenly began twitching his RUE and LOC. Patient is unclear of how long he was out but endorses no tongue biting or urinary loss after episode. Patient has never had a seizure before.  Patient was not sent to a hospital. Patient when hospitalized usually goes to [facility] or [facility]. This past Tuesday he was told his blood counts are low and was given 2 U of PRBC. Patient followed up with is oncologist today for chemo however Dr. Garcia did not feel comfortable giving him chemo and sent him to [facility] for further workup regarding seizure and for "Hope that he can be enrolled in hospice".  Patient currently has no acute complaints. ROS is positive for almost 200 pound weight loss since he was diagnosed, anorexia and chronic femur pain.  2 weeks ago. ROS is negative for fevers, chill, Headache, SOB, Cough, runny nose, sore throat, abdominal pain, N/V/D or changes in urinary habits. Reports having low blood pressure readings in the past few months   PMH: see above; had HTN in the past    PSH: bilateral femur and knee replacements    SH: doesnt smoke cigarettes, drink alcohol or use; has 2 daughters    ALLERGIES: none    MEDICATIONS: unclear but takes nivolumab for chemo and tylenol for pain    VITAL SIGNS:  ICU Vital Signs Last 24 Hrs  T(C): 36.5 (02 Aug 2018 13:58), Max: 36.8 (02 Aug 2018 12:59)  T(F): 97.7 (02 Aug 2018 13:58), Max: 98.2 (02 Aug 2018 12:59)  HR: 90 (02 Aug 2018 15:24) (88 - 90)  BP: 89/55 (02 Aug 2018 15:24) (88/61 - 94/62)  BP(mean): --  ABP: --  ABP(mean): --  RR: 24 (02 Aug 2018 13:58) (24 - 25)  SpO2: 95% (02 Aug 2018 13:58) (95% - 95%)    CAPILLARY BLOOD GLUCOSE          PHYSICAL EXAM:  Constitutional: resting comfortably in bed, NAD, hollowed out cheeks  HEENT: NC/AT; PERRL, anicteric sclera; no oropharyngeal erythema or exudates; dry MM; conjunctivial pallor   Neck: supple, no appreciable JVD, port seen in place   Respiratory: CTA B/L, no W/R/R; respirations appear non-labored, conversive in full sentences  Cardiovascular: +S1/S2, RRR  Gastrointestinal: abdomen soft, NT/ND  Extremities: LLE edema 1+ increased as compared to the right   Vascular: 2+ radial and 1+ DP pulses B/L  Dermatologic: skin normal color and turgor; no visible rashes  Neurological: AAOX3, SAVEAHAART exam for attention normal; reflexes    in patellar and brachiradialis; no dysdidochokinesis;   LABS:                        7.8    8.7   )-----------( 318      ( 02 Aug 2018 13:43 )             26.0     08-02    137  |  98  |  11  ----------------------------<  84  4.1   |  23  |  0.68    Ca    7.8<L>      02 Aug 2018 13:43  Mg     1.8     08-02    TPro  7.1  /  Alb  1.8<L>  /  TBili  0.8  /  DBili  x   /  AST  8<L>  /  ALT  <5<L>  /  AlkPhos  354<H>  08-02    PT/INR - ( 02 Aug 2018 13:43 )   PT: 18.1 sec;   INR: 1.61          PTT - ( 02 Aug 2018 13:43 )  PTT:40.7 sec              EKG: NSR with no ST-T wave changes   RADIOLOGY & ADDITIONAL TESTS: CXR WNL       A/P: Patient is a 54 yo male with a pmhx of metastatic RCC who was sent at the urge of his doctor for seizure workup. ICU was consulted for relative hypotension. Patient clinically without complaints and denies dizziness or blurry vision; PE notable for dry MM, conjunctival pallor and cachexia with LLE swelling; Relative hypotension likely secondary to hypovolemia secondary to poor PO Intake and dehydration with underlying anemia compounding issue ; no signs of cardiogenic shock on exam; Well score 2.5 for PE; no right heart strain noted on EKG; denies being on any steroids recently at least not since 2018 started    -- check iron studies before giving 1 unit of PRBC as per attending  -- trend H/H post transfusion; hgb seems to be anemia of chronic disease in the setting of malignancy and NO complaints of epistaxis, melena, hematemesis or hematochezia  --check orthostatics  -- check TSH for hypothyroidism  -- consider palliative consult although patient is currently FULL CODE and was confirmed multiple times       DISPO: Patient is clinically stable for his workup to take place on the F.       -- seizure workup as per primary medicine team [facility] SERVICE CONSULTATION NOTE    CC:    HPI: Patient is a gentleman with a pmhx of metastatic RCC (diagnosed in 2016; mets to brain, lungs, femur bilaterally) who is here presenting from chemo center at the urge of his oncologist since oncologist feels he is getting substandard care at his facility. Patient reports he was diagnosed with his RCC 2 years ago and has been treated with radiation to the brain in 2017 and has been back on chemo since may every 2 weeks. Patient reports he has also been mostly bedbound since he had bilateral femoral surgeries in november for bone marrow spread. Patient reports this sunday he was talking to this friend when he suddenly began twitching his RUE and LOC. Patient is unclear of how long he was out but endorses no tongue biting or urinary loss after episode. Patient has never had a seizure before.  Patient was not sent to a hospital. Patient when hospitalized usually goes to [facility] or [facility]. This past Tuesday he was told his blood counts are low and was given 2 U of PRBC. Patient followed up with is oncologist today for chemo however Dr. Garcia did not feel comfortable giving him chemo and sent him to [facility] for further workup regarding seizure and for "Hope that he can be enrolled in hospice".  Patient currently has no acute complaints. ROS is positive for almost 200 pound weight loss since he was diagnosed, anorexia and chronic femur pain.  2 weeks ago. ROS is negative for fevers, chill, Headache, SOB, Cough, runny nose, sore throat, abdominal pain, N/V/D or changes in urinary habits. Reports having low blood pressure readings in the past few months   PMH: see above; had HTN in the past    PSH: bilateral femur and knee replacements    SH: doesnt smoke cigarettes, drink alcohol or use; has 2 daughters    ALLERGIES: none    MEDICATIONS: unclear but takes nivolumab for chemo and tylenol for pain    VITAL SIGNS:  ICU Vital Signs Last 24 Hrs  T(C): 36.5 (02 Aug 2018 13:58), Max: 36.8 (02 Aug 2018 12:59)  T(F): 97.7 (02 Aug 2018 13:58), Max: 98.2 (02 Aug 2018 12:59)  HR: 90 (02 Aug 2018 15:24) (88 - 90)  BP: 89/55 (02 Aug 2018 15:24) (88/61 - 94/62)  BP(mean): --  ABP: --  ABP(mean): --  RR: 24 (02 Aug 2018 13:58) (24 - 25)  SpO2: 95% (02 Aug 2018 13:58) (95% - 95%)    CAPILLARY BLOOD GLUCOSE          PHYSICAL EXAM:  Constitutional: resting comfortably in bed, NAD, hollowed out cheeks  HEENT: NC/AT; PERRL, anicteric sclera; no oropharyngeal erythema or exudates; dry MM; conjunctivial pallor   Neck: supple, no appreciable JVD, port seen in place   Respiratory: CTA B/L, no W/R/R; respirations appear non-labored, conversive in full sentences  Cardiovascular: +S1/S2, RRR  Gastrointestinal: abdomen soft, NT/ND  Extremities: LLE edema 1+ increased as compared to the right   Vascular: 2+ radial and 1+ DP pulses B/L  Dermatologic: skin normal color and turgor; no visible rashes  Neurological: AAOX3, SAVEAHAART exam for attention normal; reflexes    in patellar and brachiradialis; no dysdidochokinesis;   LABS:                        7.8    8.7   )-----------( 318      ( 02 Aug 2018 13:43 )             26.0     08-02    137  |  98  |  11  ----------------------------<  84  4.1   |  23  |  0.68    Ca    7.8<L>      02 Aug 2018 13:43  Mg     1.8     08-02    TPro  7.1  /  Alb  1.8<L>  /  TBili  0.8  /  DBili  x   /  AST  8<L>  /  ALT  <5<L>  /  AlkPhos  354<H>  08-02    PT/INR - ( 02 Aug 2018 13:43 )   PT: 18.1 sec;   INR: 1.61          PTT - ( 02 Aug 2018 13:43 )  PTT:40.7 sec              EKG: NSR with no ST-T wave changes   RADIOLOGY & ADDITIONAL TESTS: CXR WNL       A/P: Patient is a 54 yo male with a pmhx of metastatic RCC who was sent at the urge of his doctor for seizure workup. ICU was consulted for relative hypotension. Patient clinically without complaints and denies dizziness or blurry vision; PE notable for dry MM, conjunctival pallor and cachexia with LLE swelling; Relative hypotension likely secondary to hypovolemia secondary to poor PO Intake and dehydration with underlying anemia compounding issue ; no signs of cardiogenic shock on exam; Well score 2.5 for PE; no right heart strain noted on EKG; denies being on any steroids recently at least not since 2018 started    -- check iron studies before giving 1 unit of PRBC as per attending  -- trend H/H post transfusion; hgb seems to be anemia of chronic disease in the setting of malignancy and NO complaints of epistaxis, melena, hematemesis or hematochezia  --check orthostatics  -- check TSH for hypothyroidism  -- consider palliative consult although patient is currently FULL CODE and was confirmed multiple times   -- seizure workup as per primary medicine team    DISPO: Patient is clinically stable for his workup to take place on the F.

## 2018-08-02 NOTE — ED ADULT TRIAGE NOTE - CHIEF COMPLAINT QUOTE
patient BIBA from doctor's office. patient with metastatic renal cell carcinoma sent to [facility] for oncology treatment. as per EMS patient had new onset of seizures on sunday, with posible mets to brain. patient is alert and oriented x 3. 20g in left hand by EMS

## 2018-08-02 NOTE — ED ADULT NURSE NOTE - OBJECTIVE STATEMENT
52 y/o male BIBA from chemotherapy for hypotension. Pt has hx of renal cell carcinoma with metastases. Pt has hx of bilateral hip replacement with pain in both hips. Pt is confused, unable to say where he is from or who his provider is or where he gets chemotherapy. Pt told MD in ER that he wants "everything done" regarding end of life care. Palliative care paged,  aware. Pt placed on monitor, hypotensive in the 80s. Pt denies dizziness, states he is asymptomatic except for hip pain. OCTAVIA BARRAZAC collected blood work via ultrasound guided phlebotomy. Pt has mediport in place. Stage 1 heel ulcers noted on bilateral lower extremities. Pt is incontinent of urine.

## 2018-08-02 NOTE — H&P ADULT - PROBLEM SELECTOR PLAN 5
Code: Full  DVT ppx: SCDs; hold heparin due to anemia   GI ppx: pantoprazole  Dispo: admit to F Diagnosed July 2016, with mets to brain, lungs, femur bilaterally  s/p WBRT in August-Sept. 2017 for multiple brain mets  s/p sunitinib in in 2017  s/p nivolomab - 8 cycles  s/p repair in November 2017 for bilateral pathologic femur fractures     D/c with oncologist about further treatment plans.  Consider Palliative care consult.  Supportive treatment as above. Diagnosed July 2016, with mets to brain, lungs, femur bilaterally  s/p WBRT in August-Sept. 2017 for multiple brain mets  s/p sunitinib in in 2017  s/p nivolomab - 8 cycles  s/p repair in November 2017 for bilateral pathologic femur fractures     D/c with oncologist about further treatment plans.  Consider Palliative care consult given poor prognosis.   Supportive treatment as above.

## 2018-08-02 NOTE — ED ADULT NURSE NOTE - CHIEF COMPLAINT QUOTE
patient BIBA from doctor's office. patient with metastatic renal cell carcinoma sent to [facility] for oncology treatment. as per EMS patient had new onset of seizures on sunday, with posible mets to brain. patient is alert and oriented x 3. 20g in left hand by EMS

## 2018-08-02 NOTE — H&P ADULT - PROBLEM SELECTOR PLAN 1
Chronic, 2/2 to RCC.  Hb 7.8 on admission. baseline around 7.4 to 8.0 per outpatient labs   Has routinely been getting transfusions at [facility]  Iron studies: low iron, low TIBC, high ferritin, suggesting anemia of chronic disease    Transfuse unit pRBCs per ICU recs. Re-check post-tranfusion H&H. Unwitnessed episode of RUE twitching followed by LOC on Sunday, 7/28.   CT head showing nonspecific patchy areas of low attenuation in the cerebral white matter.  History of RCC w/ mets to brain, s/p WBRT in August-Sept. 2017.  No significant electrolyte abnormalities on admission. Neuro exam non-focal.     Consider EEG, neurology consult, MRI head Unwitnessed episode of RUE twitching followed by LOC on Sunday, 7/28.   CT head showing nonspecific patchy areas of low attenuation in the cerebral white matter.  History of RCC w/ mets to brain, s/p WBRT in August-Sept. 2017.  No significant electrolyte abnormalities on admission. Neuro exam non-focal.   Given history of brain mets from RCC, here is high suspicion recurrent mets to brain despite CT findings. If patient did have epileptic event, it was less likely from electrolyte abnormality or febrile seizure given findings on admission.    EEG, MRI head  Consider neurology consult Unwitnessed episode of RUE twitching followed by LOC on Sunday, 7/28.   CT head showing nonspecific patchy areas of low attenuation in the cerebral white matter.  History of RCC w/ mets to brain, s/p WBRT in August-Sept. 2017.  No significant electrolyte abnormalities on admission. Neuro exam non-focal.   Given history of brain mets from RCC, here is high suspicion recurrent mets to brain despite CT findings. If patient did have epileptic event, it was less likely from electrolyte abnormality or febrile seizure given findings on admission.    MRI head w/ and w/o contrast (please ensure patient's prosthesis is MRI compatible)  EEG  Consider neurology consult Unwitnessed episode of RUE twitching followed by LOC on Sunday, 7/28.   CT head showing nonspecific patchy areas of low attenuation in the cerebral white matter.  History of RCC w/ mets to brain, s/p WBRT in August-Sept. 2017.  No significant electrolyte abnormalities on admission. Neuro exam non-focal.   Given history of brain mets from RCC, here is high suspicion recurrent mets to brain despite CT findings. If patient did have epileptic event, it was less likely from electrolyte abnormality or febrile seizure given findings on admission.    MRI head w/ and w/o contrast (please ensure patient's prosthesis is MRI compatible)  EEG  Neuro consult in AM

## 2018-08-02 NOTE — ED PROVIDER NOTE - CARE PLAN
Principal Discharge DX:	Hypotension, unspecified hypotension type  Secondary Diagnosis:	Anemia, unspecified type  Secondary Diagnosis:	Leukocytosis, unspecified type

## 2018-08-02 NOTE — H&P ADULT - PROBLEM SELECTOR PLAN 2
BP 94/62 in ED. s/p 1 L NS.   No signs of sepsis at this time: patient afebrile, WBC WNL    Transfuse 1 unit pRBCs and monitor BP. Chronic, 2/2 to RCC.  Hb 7.8 on admission. baseline around 7.4 to 8.0 per outpatient labs   Has routinely been getting transfusions at [facility]  Iron studies: low iron, low TIBC, high ferritin, suggesting anemia of chronic disease    Transfuse unit pRBCs per ICU recs. Re-check post-tranfusion H&H. Chronic, 2/2 to RCC.  Hb 7.8 on admission. baseline around 7.4 to 8.0 per outpatient labs   Has routinely been getting transfusions at [facility]  Iron studies: low iron, low TIBC, high ferritin, suggesting anemia of chronic disease    Transfuse 1 unit pRBCs per ICU recs. Re-check post-tranfusion H&H. Chronic, 2/2 to RCC.  Hb 7.8 on admission. baseline around 7.4 to 8.0 per outpatient labs   Has routinely been getting transfusions at [facility]  Iron studies: low iron, low TIBC, high ferritin, suggesting anemia of chronic disease    Will transfuse 1 unit pRBC, recheck CBC after  Trend CBC

## 2018-08-02 NOTE — CONSULT NOTE ADULT - ATTENDING COMMENTS
Patient seen and examined with house-staff during bedside rounds.  Resident note read, including vitals, physical findings, laboratory data, and radiological reports.   Revisions included below.  Direct personal management at bed side and extensive interpretation of the data.  Plan was outlined and discussed in details with the housestaff.  Decision making of high complexity  Action taken for acute disease activity to reflect the level of care provided:  - medication reconciliation  - review laboratory data  The patient was seen in emergency room discuss it with the resident and the ER attending. Hypertension is most likely second-rate volume depletion and possible anemia. Patient has renal cell cancer and has a poor prognosis. Consider palliative . Patient will require transfusion. He responded to IV fluid. Patient was evaluated for the regular floor

## 2018-08-02 NOTE — H&P ADULT - PROBLEM SELECTOR PROBLEM 4
Renal cell carcinoma of right kidney Closed fracture of femur, unspecified fracture morphology, unspecified laterality, unspecified portion of femur, sequela

## 2018-08-02 NOTE — H&P ADULT - PROBLEM SELECTOR PLAN 7
Will update patient's outpatient providers on discharge. 1) PCP Contacted on Admission: (N) --> Name & Phone #: Dr. Garcia (oncologist) - [phone]  2) Date of Contact with PCP:  3) PCP Contacted at Discharge: (Y/N, N/A)  4) Summary of Handoff Given to PCP:   5) Post-Discharge Appointment Date and Location:

## 2018-08-02 NOTE — H&P ADULT - PROBLEM SELECTOR PLAN 4
Diagnosed July 2016, with mets to brain, lungs, femur bilaterally  s/p WBRT in August-Sept. 2017 for multiple brain mets  s/p sunitinib in in 2017  s/p nivolomab - 8 cycles  s/p repair in November 2017 for bilateral pathologic femur fractures     Pain control: morphine PRN  D/c with oncologist about further treatment plans.  Consider Palliative care consult.  Supportive treatment as above. Bilateral pathologic femur fractures in November 2017, s/p surgical repair at [facility].  Patient reports chronic hip and femur pain, especially worse in left side. Takes Tylenol at SNF.  Severe left hip and femur pain on admission, relieved w/ morphine.    Pain control: morphine PRN Bilateral pathologic femur fractures in November 2017, s/p surgical repair at [facility].  Patient reports chronic hip and femur pain, especially worse in left side. Takes Tylenol at SNF.  Severe left hip and femur pain on admission, relieved w/ morphine.    Pain control: Percocet PRN  Obtain x-rays of bilateral hips and knees

## 2018-08-02 NOTE — ED ADULT NURSE NOTE - NSIMPLEMENTINTERV_GEN_ALL_ED
Implemented All Fall with Harm Risk Interventions:  [place] to call system. Call bell, personal items and telephone within reach. Instruct patient to call for assistance. Room bathroom lighting operational. Non-slip footwear when patient is off stretcher. Physically safe environment: no spills, clutter or unnecessary equipment. Stretcher in lowest position, wheels locked, appropriate side rails in place. Provide visual cue, wrist band, yellow gown, etc. Monitor gait and stability. Monitor for mental status changes and reorient to person, place, and time. Review medications for side effects contributing to fall risk. Reinforce activity limits and safety measures with patient and family. Provide visual clues: red socks.

## 2018-08-03 DIAGNOSIS — R74.8 ABNORMAL LEVELS OF OTHER SERUM ENZYMES: ICD-10-CM

## 2018-08-03 DIAGNOSIS — D63.0 ANEMIA IN NEOPLASTIC DISEASE: ICD-10-CM

## 2018-08-03 DIAGNOSIS — E88.09 OTHER DISORDERS OF PLASMA-PROTEIN METABOLISM, NOT ELSEWHERE CLASSIFIED: ICD-10-CM

## 2018-08-03 DIAGNOSIS — S72.90XS: ICD-10-CM

## 2018-08-03 DIAGNOSIS — C64.9 MALIGNANT NEOPLASM OF UNSPECIFIED KIDNEY, EXCEPT RENAL PELVIS: ICD-10-CM

## 2018-08-03 DIAGNOSIS — M25.551 PAIN IN RIGHT HIP: ICD-10-CM

## 2018-08-03 LAB
ALBUMIN SERPL ELPH-MCNC: 2 G/DL — LOW (ref 3.3–5)
ALP SERPL-CCNC: 304 U/L — HIGH (ref 40–120)
ALT FLD-CCNC: <5 U/L — LOW (ref 10–45)
ANION GAP SERPL CALC-SCNC: 14 MMOL/L — SIGNIFICANT CHANGE UP (ref 5–17)
APPEARANCE UR: CLEAR — SIGNIFICANT CHANGE UP
AST SERPL-CCNC: <5 U/L — LOW (ref 10–40)
BACTERIA # UR AUTO: PRESENT /HPF
BASOPHILS NFR BLD AUTO: 0.1 % — SIGNIFICANT CHANGE UP (ref 0–2)
BILIRUB SERPL-MCNC: 1.2 MG/DL — SIGNIFICANT CHANGE UP (ref 0.2–1.2)
BILIRUB UR-MCNC: NEGATIVE — SIGNIFICANT CHANGE UP
BUN SERPL-MCNC: 11 MG/DL — SIGNIFICANT CHANGE UP (ref 7–23)
CALCIUM SERPL-MCNC: 7.5 MG/DL — LOW (ref 8.4–10.5)
CHLORIDE SERPL-SCNC: 102 MMOL/L — SIGNIFICANT CHANGE UP (ref 96–108)
CO2 SERPL-SCNC: 24 MMOL/L — SIGNIFICANT CHANGE UP (ref 22–31)
COLOR SPEC: YELLOW — SIGNIFICANT CHANGE UP
COMMENT - URINE: SIGNIFICANT CHANGE UP
CREAT SERPL-MCNC: 0.6 MG/DL — SIGNIFICANT CHANGE UP (ref 0.5–1.3)
DIFF PNL FLD: NEGATIVE — SIGNIFICANT CHANGE UP
EOSINOPHIL NFR BLD AUTO: 2.7 % — SIGNIFICANT CHANGE UP (ref 0–6)
EPI CELLS # UR: SIGNIFICANT CHANGE UP /HPF (ref 0–5)
GLUCOSE SERPL-MCNC: 85 MG/DL — SIGNIFICANT CHANGE UP (ref 70–99)
GLUCOSE UR QL: NEGATIVE — SIGNIFICANT CHANGE UP
HCT VFR BLD CALC: 28.7 % — LOW (ref 39–50)
HGB BLD-MCNC: 8.8 G/DL — LOW (ref 13–17)
KETONES UR-MCNC: NEGATIVE — SIGNIFICANT CHANGE UP
LEUKOCYTE ESTERASE UR-ACNC: ABNORMAL
LYMPHOCYTES # BLD AUTO: 14.8 % — SIGNIFICANT CHANGE UP (ref 13–44)
MAGNESIUM SERPL-MCNC: 1.8 MG/DL — SIGNIFICANT CHANGE UP (ref 1.6–2.6)
MCHC RBC-ENTMCNC: 26.8 PG — LOW (ref 27–34)
MCHC RBC-ENTMCNC: 30.7 G/DL — LOW (ref 32–36)
MCV RBC AUTO: 87.5 FL — SIGNIFICANT CHANGE UP (ref 80–100)
MONOCYTES NFR BLD AUTO: 13 % — SIGNIFICANT CHANGE UP (ref 2–14)
NEUTROPHILS NFR BLD AUTO: 69.4 % — SIGNIFICANT CHANGE UP (ref 43–77)
NITRITE UR-MCNC: NEGATIVE — SIGNIFICANT CHANGE UP
PH UR: 6 — SIGNIFICANT CHANGE UP (ref 5–8)
PHOSPHATE SERPL-MCNC: 2.8 MG/DL — SIGNIFICANT CHANGE UP (ref 2.5–4.5)
PLATELET # BLD AUTO: 355 K/UL — SIGNIFICANT CHANGE UP (ref 150–400)
POTASSIUM SERPL-MCNC: 3.5 MMOL/L — SIGNIFICANT CHANGE UP (ref 3.5–5.3)
POTASSIUM SERPL-SCNC: 3.5 MMOL/L — SIGNIFICANT CHANGE UP (ref 3.5–5.3)
PROT SERPL-MCNC: 6.5 G/DL — SIGNIFICANT CHANGE UP (ref 6–8.3)
PROT UR-MCNC: NEGATIVE MG/DL — SIGNIFICANT CHANGE UP
RBC # BLD: 3.28 M/UL — LOW (ref 4.2–5.8)
RBC # FLD: 15.7 % — SIGNIFICANT CHANGE UP (ref 10.3–16.9)
RBC CASTS # UR COMP ASSIST: < 5 /HPF — SIGNIFICANT CHANGE UP
SODIUM SERPL-SCNC: 140 MMOL/L — SIGNIFICANT CHANGE UP (ref 135–145)
SP GR SPEC: <=1.005 — SIGNIFICANT CHANGE UP (ref 1–1.03)
UROBILINOGEN FLD QL: 0.2 E.U./DL — SIGNIFICANT CHANGE UP
WBC # BLD: 9.3 K/UL — SIGNIFICANT CHANGE UP (ref 3.8–10.5)
WBC # FLD AUTO: 9.3 K/UL — SIGNIFICANT CHANGE UP (ref 3.8–10.5)
WBC UR QL: ABNORMAL /HPF

## 2018-08-03 PROCEDURE — 99233 SBSQ HOSP IP/OBS HIGH 50: CPT

## 2018-08-03 RX ORDER — ONDANSETRON 8 MG/1
4 TABLET, FILM COATED ORAL EVERY 6 HOURS
Qty: 0 | Refills: 0 | Status: DISCONTINUED | OUTPATIENT
Start: 2018-08-03 | End: 2018-08-07

## 2018-08-03 RX ORDER — MORPHINE SULFATE 50 MG/1
2 CAPSULE, EXTENDED RELEASE ORAL EVERY 6 HOURS
Qty: 0 | Refills: 0 | Status: DISCONTINUED | OUTPATIENT
Start: 2018-08-03 | End: 2018-08-07

## 2018-08-03 RX ORDER — ENOXAPARIN SODIUM 100 MG/ML
40 INJECTION SUBCUTANEOUS DAILY
Qty: 0 | Refills: 0 | Status: DISCONTINUED | OUTPATIENT
Start: 2018-08-03 | End: 2018-08-07

## 2018-08-03 RX ORDER — OXYCODONE AND ACETAMINOPHEN 5; 325 MG/1; MG/1
1 TABLET ORAL EVERY 4 HOURS
Qty: 0 | Refills: 0 | Status: DISCONTINUED | OUTPATIENT
Start: 2018-08-03 | End: 2018-08-03

## 2018-08-03 RX ADMIN — OXYCODONE AND ACETAMINOPHEN 1 TABLET(S): 5; 325 TABLET ORAL at 05:39

## 2018-08-03 RX ADMIN — OXYCODONE AND ACETAMINOPHEN 1 TABLET(S): 5; 325 TABLET ORAL at 06:29

## 2018-08-03 RX ADMIN — MORPHINE SULFATE 2 MILLIGRAM(S): 50 CAPSULE, EXTENDED RELEASE ORAL at 09:09

## 2018-08-03 RX ADMIN — MORPHINE SULFATE 2 MILLIGRAM(S): 50 CAPSULE, EXTENDED RELEASE ORAL at 08:08

## 2018-08-03 RX ADMIN — MORPHINE SULFATE 2 MILLIGRAM(S): 50 CAPSULE, EXTENDED RELEASE ORAL at 14:22

## 2018-08-03 RX ADMIN — MORPHINE SULFATE 2 MILLIGRAM(S): 50 CAPSULE, EXTENDED RELEASE ORAL at 20:28

## 2018-08-03 RX ADMIN — ENOXAPARIN SODIUM 40 MILLIGRAM(S): 100 INJECTION SUBCUTANEOUS at 12:21

## 2018-08-03 RX ADMIN — MORPHINE SULFATE 2 MILLIGRAM(S): 50 CAPSULE, EXTENDED RELEASE ORAL at 20:43

## 2018-08-03 RX ADMIN — MORPHINE SULFATE 2 MILLIGRAM(S): 50 CAPSULE, EXTENDED RELEASE ORAL at 15:10

## 2018-08-03 NOTE — PROGRESS NOTE ADULT - PROBLEM SELECTOR PLAN 1
Unwitnessed episode of RUE twitching followed by LOC on Sunday, 7/28.   CT head showing nonspecific patchy areas of low attenuation in the cerebral white matter.  History of RCC w/ mets to brain, s/p WBRT in August-Sept. 2017.  No significant electrolyte abnormalities on admission. Neuro exam non-focal.   Given history of brain mets from RCC, here is high suspicion recurrent mets to brain despite CT findings. If patient did have epileptic event, it was less likely from electrolyte abnormality or febrile seizure given findings on admission   f/u MRI head w/ and w/o contrast (please ensure patient's prosthesis is MRI compatible)  f/u EEG

## 2018-08-03 NOTE — ADVANCED PRACTICE NURSE CONSULT - ASSESSMENT
Patient presented on admission with a sacral unstageable pressure injury with 90% yellow slough and 10% pale red non granulating tissue measuring 2 cm x 1 cm with small amount of serous exudate. Periwound skin hyperpigmented. Patient able to turn independently in bed. RNShweta present during assessment.

## 2018-08-03 NOTE — PROGRESS NOTE ADULT - PROBLEM SELECTOR PLAN 1
unclear etiology, but concern for seizure by hx; plan for EEG and MRI (r/o brain mets), may need Neuro input, pending results; need collateral info from outpatient oncologist; orthostatics negative; checking TTE

## 2018-08-03 NOTE — PROGRESS NOTE ADULT - PROBLEM SELECTOR PLAN 3
BP 80s-90s in the ER but this is his baseline   No signs of sepsis at this time: patient afebrile, WBC WNL  continue to monitor BP   f/u orthostatics

## 2018-08-03 NOTE — ADVANCED PRACTICE NURSE CONSULT - REASON FOR CONSULT
[facility] nurse consult to assess sacral pressure injury that was present on admission. Patient is a 53 year old male with history of metastatic RCC (diagnosed in 2016; mets to brain, lungs, femur bilaterally), currently living in SNF who present to [facility] ED from [facility] center at the urge of his oncologist.

## 2018-08-03 NOTE — PROGRESS NOTE ADULT - SUBJECTIVE AND OBJECTIVE BOX
OVERNIGHT EVENTS:    SUBJECTIVE:    Vital Signs Last 12 Hrs  T(F): 98 (08-03-18 @ 13:25), Max: 98.3 (08-03-18 @ 05:43)  HR: 84 (08-03-18 @ 13:25) (84 - 97)  BP: 97/75 (08-03-18 @ 13:25) (89/51 - 97/75)  BP(mean): --  RR: 16 (08-03-18 @ 13:25) (16 - 17)  SpO2: 100% (08-03-18 @ 13:25) (97% - 100%)  I&O's Summary      PHYSICAL EXAM:  Constitutional: NAD, comfortable in bed.  HEENT: NC/AT, PERRLA, EOMI, no conjunctival pallor or scleral icterus, MMM  Neck: Supple, no JVD  Respiratory: Normal rate, rhythm, depth, effort. CTAB. No w/r/r.   Cardiovascular: RRR, normal S1 and S2, no m/r/g.   Gastrointestinal: +BS, soft NTND, no guarding or rebound tenderness, no palpable masses   Extremities: wwp; no cyanosis, clubbing or edema.   Vascular: Pulses equal and strong throughout.   Neurological: AAOx3, no CN deficits, strength and sensation intact throughout.   Skin: No gross skin abnormalities or rashes        LABS:                        8.8    9.3   )-----------( 355      ( 03 Aug 2018 05:39 )             28.7     08-03    140  |  102  |  11  ----------------------------<  85  3.5   |  24  |  0.60    Ca    7.5<L>      03 Aug 2018 05:39  Phos  2.8     08-03  Mg     1.8     08-03    TPro  6.5  /  Alb  2.0<L>  /  TBili  1.2  /  DBili  x   /  AST  <5<L>  /  ALT  <5<L>  /  AlkPhos  304<H>  08-03    PT/INR - ( 02 Aug 2018 13:43 )   PT: 18.1 sec;   INR: 1.61          PTT - ( 02 Aug 2018 13:43 )  PTT:40.7 sec  Urinalysis Basic - ( 03 Aug 2018 03:09 )    Color: Yellow / Appearance: Clear / SG: <=1.005 / pH: x  Gluc: x / Ketone: NEGATIVE  / Bili: Negative / Urobili: 0.2 E.U./dL   Blood: x / Protein: NEGATIVE mg/dL / Nitrite: NEGATIVE   Leuk Esterase: Moderate / RBC: < 5 /HPF / WBC Many /HPF   Sq Epi: x / Non Sq Epi: 0-5 /HPF / Bacteria: Present /HPF        RADIOLOGY & ADDITIONAL TESTS:    MEDICATIONS  (STANDING):  enoxaparin Injectable 40 milliGRAM(s) SubCutaneous daily    MEDICATIONS  (PRN):  morphine  - Injectable 2 milliGRAM(s) IV Push every 6 hours PRN Severe Pain (7 - 10)  ondansetron Injectable 4 milliGRAM(s) IV Push every 6 hours PRN Nausea and/or Vomiting OVERNIGHT EVENTS: DIANA     SUBJECTIVE: Patient reports some mild L hip discomfort but otherwise relief with morphine. No shortness of breath, chest pain, fevers/chills, dysuria.     Vital Signs Last 12 Hrs  T(F): 98 (08-03-18 @ 13:25), Max: 98.3 (08-03-18 @ 05:43)  HR: 84 (08-03-18 @ 13:25) (84 - 97)  BP: 97/75 (08-03-18 @ 13:25) (89/51 - 97/75)  BP(mean): --  RR: 16 (08-03-18 @ 13:25) (16 - 17)  SpO2: 100% (08-03-18 @ 13:25) (97% - 100%)  I&O's Summary      PHYSICAL EXAM:  Constitutional: NAD, comfortable in bed.  HEENT: NC/AT, PERRLA, EOMI, no conjunctival pallor or scleral icterus, MMM  Neck: Supple, no JVD  Respiratory: Normal rate, rhythm, depth, effort. CTAB. No w/r/r.   Cardiovascular: RRR, normal S1 and S2, no m/r/g.   Gastrointestinal: +BS, soft NTND, no guarding or rebound tenderness, no palpable masses   Extremities: wwp; no cyanosis, clubbing or edema.   Vascular: Pulses equal and strong throughout.   Neurological: AAOx3, no CN deficits, strength and sensation intact throughout.   Skin: No gross skin abnormalities or rashes        LABS:                        8.8    9.3   )-----------( 355      ( 03 Aug 2018 05:39 )             28.7     08-03    140  |  102  |  11  ----------------------------<  85  3.5   |  24  |  0.60    Ca    7.5<L>      03 Aug 2018 05:39  Phos  2.8     08-03  Mg     1.8     08-03    TPro  6.5  /  Alb  2.0<L>  /  TBili  1.2  /  DBili  x   /  AST  <5<L>  /  ALT  <5<L>  /  AlkPhos  304<H>  08-03    PT/INR - ( 02 Aug 2018 13:43 )   PT: 18.1 sec;   INR: 1.61          PTT - ( 02 Aug 2018 13:43 )  PTT:40.7 sec  Urinalysis Basic - ( 03 Aug 2018 03:09 )    Color: Yellow / Appearance: Clear / SG: <=1.005 / pH: x  Gluc: x / Ketone: NEGATIVE  / Bili: Negative / Urobili: 0.2 E.U./dL   Blood: x / Protein: NEGATIVE mg/dL / Nitrite: NEGATIVE   Leuk Esterase: Moderate / RBC: < 5 /HPF / WBC Many /HPF   Sq Epi: x / Non Sq Epi: 0-5 /HPF / Bacteria: Present /HPF        RADIOLOGY & ADDITIONAL TESTS:    MEDICATIONS  (STANDING):  enoxaparin Injectable 40 milliGRAM(s) SubCutaneous daily    MEDICATIONS  (PRN):  morphine  - Injectable 2 milliGRAM(s) IV Push every 6 hours PRN Severe Pain (7 - 10)  ondansetron Injectable 4 milliGRAM(s) IV Push every 6 hours PRN Nausea and/or Vomiting

## 2018-08-03 NOTE — ADVANCED PRACTICE NURSE CONSULT - RECOMMEDATIONS
Sacral pressure - cleanse with normal saline, apply Cavilon liquid skin barrier to periwound, Medihoney to wound, cover with foam dressing every other day or prn if soiled or saturated.   Discussed assessment and recommendations with Shweta and house staff, Dr Blackmon.

## 2018-08-03 NOTE — PROGRESS NOTE ADULT - SUBJECTIVE AND OBJECTIVE BOX
Patient is a 53y old  Male who presents with a chief complaint of hypotension, seizure-like activity, metastatic RCC (02 Aug 2018 20:56)      INTERVAL HPI/OVERNIGHT EVENTS:    Pt. seen and examined at 2:30PM  Pt. reports no further seizure-like activity or loss of consciousness   Denies HA, blurry vision, CP, SOB, palpitations, F/C, bleeding sx  c/o chronic B/L knee pain, controlled w/ rx    Review of Systems: 12 point review of systems otherwise negative    MEDICATIONS  (STANDING):  enoxaparin Injectable 40 milliGRAM(s) SubCutaneous daily    MEDICATIONS  (PRN):  morphine  - Injectable 2 milliGRAM(s) IV Push every 6 hours PRN Severe Pain (7 - 10)  ondansetron Injectable 4 milliGRAM(s) IV Push every 6 hours PRN Nausea and/or Vomiting      Allergies    No Known Allergies    Intolerances          Vital Signs Last 24 Hrs  T(C): 37.6 (03 Aug 2018 20:27), Max: 37.6 (03 Aug 2018 20:27)  T(F): 99.7 (03 Aug 2018 20:27), Max: 99.7 (03 Aug 2018 20:27)  HR: 96 (03 Aug 2018 20:27) (82 - 97)  BP: 91/59 (03 Aug 2018 20:27) (83/60 - 97/75)  BP(mean): 66 (02 Aug 2018 22:37) (66 - 66)  RR: 18 (03 Aug 2018 20:27) (12 - 18)  SpO2: 100% (03 Aug 2018 20:27) (97% - 100%)  CAPILLARY BLOOD GLUCOSE            Physical Exam:  (at 2:30PM)  Daily Height in cm: 177.8 (03 Aug 2018 13:25)    Daily   General:  cachectic but otherwise well-appearing in NAD  HEENT:  MMM  CV:  RRR, no murmur   Lungs:  CTA B/L  Abdomen:  soft NT ND  Skin:  WWP  Neuro:  AAOx2-3, non-focal    LABS:                        8.8    9.3   )-----------( 355      ( 03 Aug 2018 05:39 )             28.7     08-03    140  |  102  |  11  ----------------------------<  85  3.5   |  24  |  0.60    Ca    7.5<L>      03 Aug 2018 05:39  Phos  2.8     08-03  Mg     1.8     08-03    TPro  6.5  /  Alb  2.0<L>  /  TBili  1.2  /  DBili  x   /  AST  <5<L>  /  ALT  <5<L>  /  AlkPhos  304<H>  08-03    PT/INR - ( 02 Aug 2018 13:43 )   PT: 18.1 sec;   INR: 1.61          PTT - ( 02 Aug 2018 13:43 )  PTT:40.7 sec  Urinalysis Basic - ( 03 Aug 2018 03:09 )    Color: Yellow / Appearance: Clear / SG: <=1.005 / pH: x  Gluc: x / Ketone: NEGATIVE  / Bili: Negative / Urobili: 0.2 E.U./dL   Blood: x / Protein: NEGATIVE mg/dL / Nitrite: NEGATIVE   Leuk Esterase: Moderate / RBC: < 5 /HPF / WBC Many /HPF   Sq Epi: x / Non Sq Epi: 0-5 /HPF / Bacteria: Present /HPF          RADIOLOGY & ADDITIONAL TESTS:    ---------------------------------------------------------------------------  I personally reviewed: [  ]EKG   [  ]CXR    [  ] CT    [  ]Other  ---------------------------------------------------------------------------  PLEASE CHECK WHEN PRESENT:     [  ]Heart Failure     [  ] Acute     [  ] Acute on Chronic     [  ] Chronic  -------------------------------------------------------------------     [  ]Diastolic [HFpEF]     [  ]Systolic [HFrEF]     [  ]Combined [HFpEF & HFrEF]     [  ]Other:  -------------------------------------------------------------------  [  ]NATE     [  ]ATN     [  ]Reneal Medullary Necrosis     [  ]Renal Cortical Necrosis     [  ]Other Pathological Lesions:    [  ]CKD 1  [  ]CKD 2  [  ]CKD 3  [  ]CKD 4  [  ]CKD 5  [  ]Other  -------------------------------------------------------------------  [  ]Other/Unspecified:    --------------------------------------------------------------------    Abdominal Nutritional Status  [  ]Malnutrition: See Nutrition Note  [  ]Cachexia  [  ]Other:   [  ]Supplement Ordered:  [  ]Morbid Obesity (BMI >=40]

## 2018-08-03 NOTE — PROGRESS NOTE ADULT - ASSESSMENT
53 year old male with history of metastatic RCC (diagnosed in 2016; mets to brain, lungs, femur bilaterally), currently living in SNF who present to [facility] ED from [facility] center at the urge of his oncologist after recent seizure-like episode 5 days ago admitted for syncopal work up. 53 year old male with history of metastatic RCC (diagnosed in 2016; mets to brain, lungs, femur bilaterally), currently living in nursing home who present to [facility] ED from [facility] center at the urge of his oncologist after recent seizure-like episode 5 days ago admitted for syncopal work up.

## 2018-08-03 NOTE — PROGRESS NOTE ADULT - PROBLEM SELECTOR PLAN 4
chronic, s/p B/L hip replacements d/t pathologic fractures, per Pt.; f/u hip and knee x-rays, cont. pain control (I-STOP reviewed - on oxycodone)

## 2018-08-03 NOTE — PROGRESS NOTE ADULT - PROBLEM SELECTOR PLAN 5
Diagnosed July 2016, with mets to brain, lungs, femur bilaterally  s/p WBRT in August-Sept. 2017 for multiple brain mets  s/p sunitinib in in 2017  s/p nivolomab - 8 cycles  s/p repair in November 2017 for bilateral pathologic femur fractures   patient not amenable to palliative at this time   Supportive treatment as above. Diagnosed July 2016, with mets to brain, lungs, femur bilaterally  s/p Whole brain radiation therapy in August-Sept. 2017 for multiple brain mets  s/p sunitinib in in 2017  s/p nivolomab - 8 cycles  s/p repair in November 2017 for bilateral pathologic femur fractures   spoke with Dr. Lawton (oncologist) who recommends palliative consult but patient not amenable  Supportive treatment as above.

## 2018-08-03 NOTE — PROGRESS NOTE ADULT - PROBLEM SELECTOR PLAN 4
Bilateral pathologic femur fractures in November 2017, s/p surgical repair at [facility].  Patient reports chronic hip and femur pain, especially worse in left side. Takes Tylenol at nursing home   Severe left hip and femur pain on admission, relieved w/ morphine.  Pain control: Percocet PRN  no recent fall or worsened pain of the legs so no need to get another xray Bilateral pathologic femur fractures in November 2017, s/p surgical repair at [facility].  Patient reports chronic hip and femur pain, especially worse in left side. Takes Tylenol at nursing home   Severe left hip and femur pain on admission, relieved w/ morphine.  Pain control: Percocet PRN  No recent fall or worsened pain of the legs   f/u xrays

## 2018-08-03 NOTE — PROGRESS NOTE ADULT - PROBLEM SELECTOR PLAN 7
1) PCP Contacted on Admission: (N) --> Name & Phone #: Dr. Garcia (oncologist) -   [phone]  2) Date of Contact with PCP:  3) PCP Contacted at Discharge: (Y/N, N/A)  4) Summary of Handoff Given to PCP:   5) Post-Discharge Appointment Date and Location:

## 2018-08-04 LAB
ANION GAP SERPL CALC-SCNC: 14 MMOL/L — SIGNIFICANT CHANGE UP (ref 5–17)
BUN SERPL-MCNC: 9 MG/DL — SIGNIFICANT CHANGE UP (ref 7–23)
CALCIUM SERPL-MCNC: 8 MG/DL — LOW (ref 8.4–10.5)
CHLORIDE SERPL-SCNC: 97 MMOL/L — SIGNIFICANT CHANGE UP (ref 96–108)
CO2 SERPL-SCNC: 24 MMOL/L — SIGNIFICANT CHANGE UP (ref 22–31)
CREAT SERPL-MCNC: 0.57 MG/DL — SIGNIFICANT CHANGE UP (ref 0.5–1.3)
GLUCOSE SERPL-MCNC: 82 MG/DL — SIGNIFICANT CHANGE UP (ref 70–99)
HCT VFR BLD CALC: 26.4 % — LOW (ref 39–50)
HGB BLD-MCNC: 8.1 G/DL — LOW (ref 13–17)
MAGNESIUM SERPL-MCNC: 1.7 MG/DL — SIGNIFICANT CHANGE UP (ref 1.6–2.6)
MCHC RBC-ENTMCNC: 27.2 PG — SIGNIFICANT CHANGE UP (ref 27–34)
MCHC RBC-ENTMCNC: 30.7 G/DL — LOW (ref 32–36)
MCV RBC AUTO: 88.6 FL — SIGNIFICANT CHANGE UP (ref 80–100)
PHOSPHATE SERPL-MCNC: 2.3 MG/DL — LOW (ref 2.5–4.5)
PLATELET # BLD AUTO: 324 K/UL — SIGNIFICANT CHANGE UP (ref 150–400)
POTASSIUM SERPL-MCNC: 3.6 MMOL/L — SIGNIFICANT CHANGE UP (ref 3.5–5.3)
POTASSIUM SERPL-SCNC: 3.6 MMOL/L — SIGNIFICANT CHANGE UP (ref 3.5–5.3)
RBC # BLD: 2.98 M/UL — LOW (ref 4.2–5.8)
RBC # FLD: 15.5 % — SIGNIFICANT CHANGE UP (ref 10.3–16.9)
SODIUM SERPL-SCNC: 135 MMOL/L — SIGNIFICANT CHANGE UP (ref 135–145)
WBC # BLD: 9 K/UL — SIGNIFICANT CHANGE UP (ref 3.8–10.5)
WBC # FLD AUTO: 9 K/UL — SIGNIFICANT CHANGE UP (ref 3.8–10.5)

## 2018-08-04 PROCEDURE — 99233 SBSQ HOSP IP/OBS HIGH 50: CPT

## 2018-08-04 PROCEDURE — 70551 MRI BRAIN STEM W/O DYE: CPT | Mod: 26

## 2018-08-04 RX ORDER — POTASSIUM CHLORIDE 20 MEQ
40 PACKET (EA) ORAL ONCE
Qty: 0 | Refills: 0 | Status: COMPLETED | OUTPATIENT
Start: 2018-08-04 | End: 2018-08-04

## 2018-08-04 RX ORDER — MAGNESIUM SULFATE 500 MG/ML
2 VIAL (ML) INJECTION ONCE
Qty: 0 | Refills: 0 | Status: COMPLETED | OUTPATIENT
Start: 2018-08-04 | End: 2018-08-04

## 2018-08-04 RX ORDER — ACETAMINOPHEN 500 MG
650 TABLET ORAL EVERY 6 HOURS
Qty: 0 | Refills: 0 | Status: DISCONTINUED | OUTPATIENT
Start: 2018-08-04 | End: 2018-08-07

## 2018-08-04 RX ORDER — MULTIVIT-MIN/FERROUS GLUCONATE 9 MG/15 ML
1 LIQUID (ML) ORAL DAILY
Qty: 0 | Refills: 0 | Status: CANCELLED | OUTPATIENT
Start: 2018-08-04 | End: 2018-08-07

## 2018-08-04 RX ORDER — ACETAMINOPHEN 500 MG
1000 TABLET ORAL ONCE
Qty: 0 | Refills: 0 | Status: COMPLETED | OUTPATIENT
Start: 2018-08-04 | End: 2018-08-04

## 2018-08-04 RX ADMIN — Medication 400 MILLIGRAM(S): at 06:32

## 2018-08-04 RX ADMIN — MORPHINE SULFATE 2 MILLIGRAM(S): 50 CAPSULE, EXTENDED RELEASE ORAL at 02:48

## 2018-08-04 RX ADMIN — MORPHINE SULFATE 2 MILLIGRAM(S): 50 CAPSULE, EXTENDED RELEASE ORAL at 21:34

## 2018-08-04 RX ADMIN — Medication 50 GRAM(S): at 16:31

## 2018-08-04 RX ADMIN — MORPHINE SULFATE 2 MILLIGRAM(S): 50 CAPSULE, EXTENDED RELEASE ORAL at 15:04

## 2018-08-04 RX ADMIN — Medication 650 MILLIGRAM(S): at 17:53

## 2018-08-04 RX ADMIN — Medication 1000 MILLIGRAM(S): at 07:02

## 2018-08-04 RX ADMIN — MORPHINE SULFATE 2 MILLIGRAM(S): 50 CAPSULE, EXTENDED RELEASE ORAL at 02:33

## 2018-08-04 RX ADMIN — MORPHINE SULFATE 2 MILLIGRAM(S): 50 CAPSULE, EXTENDED RELEASE ORAL at 21:49

## 2018-08-04 RX ADMIN — MORPHINE SULFATE 2 MILLIGRAM(S): 50 CAPSULE, EXTENDED RELEASE ORAL at 16:16

## 2018-08-04 RX ADMIN — ENOXAPARIN SODIUM 40 MILLIGRAM(S): 100 INJECTION SUBCUTANEOUS at 11:43

## 2018-08-04 RX ADMIN — MORPHINE SULFATE 2 MILLIGRAM(S): 50 CAPSULE, EXTENDED RELEASE ORAL at 08:34

## 2018-08-04 RX ADMIN — MORPHINE SULFATE 2 MILLIGRAM(S): 50 CAPSULE, EXTENDED RELEASE ORAL at 09:16

## 2018-08-04 RX ADMIN — Medication 40 MILLIEQUIVALENT(S): at 11:43

## 2018-08-04 RX ADMIN — Medication 650 MILLIGRAM(S): at 16:32

## 2018-08-04 NOTE — DIETITIAN INITIAL EVALUATION ADULT. - PROBLEM SELECTOR PLAN 4
Bilateral pathologic femur fractures in November 2017, s/p surgical repair at [facility].  Patient reports chronic hip and femur pain, especially worse in left side. Takes Tylenol at SNF.  Severe left hip and femur pain on admission, relieved w/ morphine.    Pain control: Percocet PRN  Obtain x-rays of bilateral hips and knees

## 2018-08-04 NOTE — PROGRESS NOTE ADULT - PROBLEM SELECTOR PLAN 7
1) PCP Contacted on Admission: (N) --> Name & Phone #: Dr. Garcia (oncologist) -   [phone]  2) Date of Contact with PCP:  3) PCP Contacted at Discharge: (Y/N, N/A)  4) Summary of Handoff Given to PCP:   5) Post-Discharge Appointment Date and Location:

## 2018-08-04 NOTE — PROGRESS NOTE ADULT - PROBLEM SELECTOR PLAN 4
Bilateral pathologic femur fractures in November 2017, s/p surgical repair at [facility].  Patient reports chronic hip and femur pain, especially worse in left side. Takes Tylenol at nursing home   Severe left hip and femur pain on admission, relieved w/ morphine.  Pain control: Percocet PRN  No recent fall or worsened pain of the legs   f/u xrays

## 2018-08-04 NOTE — DIETITIAN INITIAL EVALUATION ADULT. - PROBLEM SELECTOR PROBLEM 4
Closed fracture of femur, unspecified fracture morphology, unspecified laterality, unspecified portion of femur, sequela

## 2018-08-04 NOTE — DIETITIAN INITIAL EVALUATION ADULT. - PROBLEM SELECTOR PLAN 3
BP 94/62 in ED. s/p 1 L NS and 1 unit pRBCs. Per patient, BP usually runs low in SNF.   No signs of sepsis at this time: patient afebrile, WBC WNL    Monitor BP. Give IV fluids if hypotensive.   Obtain orthostatic vitals.

## 2018-08-04 NOTE — PROGRESS NOTE ADULT - ASSESSMENT
53 year old male with history of metastatic RCC (diagnosed in 2016; mets to brain, lungs, femur bilaterally), currently living in nursing home who present to [facility] ED from [place] center at the urge of his oncologist after recent seizure-like episode 5 days ago admitted for syncopal work up.

## 2018-08-04 NOTE — PROGRESS NOTE ADULT - PROBLEM SELECTOR PLAN 1
Unwitnessed episode of RUE twitching followed by LOC on Sunday, 7/28.   CT head showing nonspecific patchy areas of low attenuation in the cerebral white matter.  History of RCC w/ mets to brain, s/p WBRT in August-Sept. 2017.  No significant electrolyte abnormalities on admission. Neuro exam non-focal.   Given history of brain mets from RCC, here is high suspicion recurrent mets to brain despite CT findings. If patient did have epileptic event, it was less likely from electrolyte abnormality or febrile seizure given findings on admission   f/u MRI head w/ and w/o contrast (please ensure patient's prosthesis is MRI compatible).   f/u EEG  8/4 MRI scheduled today between 4-6pm. EEG scheduled for tomorrow morning.

## 2018-08-04 NOTE — PROGRESS NOTE ADULT - SUBJECTIVE AND OBJECTIVE BOX
OVERNIGHT EVENTS: No acute events    SUBJECTIVE / INTERVAL HPI: Patient seen and examined at bedside. Pt continues to have pain in his L hip s/p ortho Sx. He denies fever, chills, chest pain, SOB, n/v/c/d.     VITAL SIGNS:  Vital Signs Last 24 Hrs  T(C): 36.4 (04 Aug 2018 09:05), Max: 37.6 (03 Aug 2018 20:27)  T(F): 97.5 (04 Aug 2018 09:05), Max: 99.7 (03 Aug 2018 20:27)  HR: 92 (04 Aug 2018 09:05) (92 - 99)  BP: 92/58 (04 Aug 2018 09:05) (83/60 - 99/70)  BP(mean): --  RR: 17 (04 Aug 2018 09:05) (16 - 18)  SpO2: 100% (04 Aug 2018 09:05) (99% - 100%)    PHYSICAL EXAM:    General: WDWN  HEENT: NCAT; PERRL, anicteric sclera; MMM  Neck: supple, trachea midline  Cardiovascular: S1, S2 normal; RRR, no M/G/R  Respiratory: CTABL; no W/R/R  Gastrointestinal: soft, nontender, nondistended. bowel sounds present.  Skin: no ulcerations or visible rashes appreciated  Extremities: WWP; no edema, clubbing or cyanosis. b/l LE sensitive to movement, baseline prior to hospital visit  Vascular: 2+ radial, DP/PT pulses B/L  Neurological: AAOx3; CN II-XII grossly intact; no focal deficits    MEDICATIONS:  MEDICATIONS  (STANDING):  enoxaparin Injectable 40 milliGRAM(s) SubCutaneous daily  magnesium sulfate  IVPB 2 Gram(s) IV Intermittent once    MEDICATIONS  (PRN):  morphine  - Injectable 2 milliGRAM(s) IV Push every 6 hours PRN Severe Pain (7 - 10)  ondansetron Injectable 4 milliGRAM(s) IV Push every 6 hours PRN Nausea and/or Vomiting      ALLERGIES:  Allergies    No Known Allergies    Intolerances        LABS:                        8.1    9.0   )-----------( 324      ( 04 Aug 2018 08:13 )             26.4     08-04    135  |  97  |  9   ----------------------------<  82  3.6   |  24  |  0.57    Ca    8.0<L>      04 Aug 2018 08:12  Phos  2.3     08-04  Mg     1.7     08-04    TPro  6.5  /  Alb  2.0<L>  /  TBili  1.2  /  DBili  x   /  AST  <5<L>  /  ALT  <5<L>  /  AlkPhos  304<H>  08-03      Urinalysis Basic - ( 03 Aug 2018 03:09 )    Color: Yellow / Appearance: Clear / SG: <=1.005 / pH: x  Gluc: x / Ketone: NEGATIVE  / Bili: Negative / Urobili: 0.2 E.U./dL   Blood: x / Protein: NEGATIVE mg/dL / Nitrite: NEGATIVE   Leuk Esterase: Moderate / RBC: < 5 /HPF / WBC Many /HPF   Sq Epi: x / Non Sq Epi: 0-5 /HPF / Bacteria: Present /HPF      CAPILLARY BLOOD GLUCOSE          RADIOLOGY & ADDITIONAL TESTS: Reviewed.

## 2018-08-04 NOTE — PHYSICAL THERAPY INITIAL EVALUATION ADULT - PREDICTED DURATION OF THERAPY (DAYS/WKS), PT EVAL
Pt. would benefit from further PT follow up to improve strength, endurance, bed mobility, balance; assess transfers, prevent further deconditioning.

## 2018-08-04 NOTE — PHYSICAL THERAPY INITIAL EVALUATION ADULT - PERTINENT HX OF CURRENT PROBLEM, REHAB EVAL
Pt. is a 53 y.o male with metastatic renal cell carcinoma with mets to brain and bilat femurs, who currently referred by oncologist for recent seizure. Pt. currently is admitted from SNF facility,

## 2018-08-04 NOTE — DIETITIAN INITIAL EVALUATION ADULT. - PROBLEM SELECTOR PLAN 6
F: s/p 1 L NS and 1 unit pRBCs. Will get more fluids if hypotensive  E: no significant electrolyte abnormalities  N: regular diet; consult nutrition services     Code: Full  DVT ppx: enoxaparin SQ  GI ppx: pantoprazole  Dispo: admit to F

## 2018-08-04 NOTE — DIETITIAN INITIAL EVALUATION ADULT. - OTHER INFO
53 y.o M came from SNF with PMHx of Metastatic RCC (2016) mets to brain, lungs, femur B/L. Pt is bed bound d/t B/L femoral surgeries (11/2017). Pt c/o chronic decreased appetite whcih led to decreased PO intake of meals for about 1-2 years now. He usually eats about 50% of his food during meals. NKFA. Pt is currently tolerating Regular diet with fair 50% PO intake of meals. Denies N/V/D/C or pain. Pt agreeable to Ensure ONS and snacks. +Multiple pressure injuries noted (unstageable at sacrum, stage 1 at right buttocks, stage 1 at left buttocks). Encourage PO intake of meals to patient. RD will f/u per protocol. Nutrition consult received for "assessment" appreciated. 53 y.o M came from SNF with PMHx of Metastatic RCC (2016) mets to brain, lungs, femur B/L. Pt is bed bound d/t B/L femoral surgeries (11/2017). Pt c/o chronic decreased appetite whcih led to decreased PO intake of meals for about 1-2 years now. He usually eats about 50% of his food during meals. NKFA. Pt is currently tolerating Regular diet with fair 50% PO intake of meals. Denies N/V/D/C or pain. Pt agreeable to Ensure ONS and snacks. +Multiple pressure injuries noted (unstageable at sacrum, stage 1 at right buttocks, stage 1 at left buttocks). Encourage PO intake of meals to patient. RD will f/u per protocol.

## 2018-08-04 NOTE — DIETITIAN INITIAL EVALUATION ADULT. - PROBLEM SELECTOR PLAN 1
Unwitnessed episode of RUE twitching followed by LOC on Sunday, 7/28.   CT head showing nonspecific patchy areas of low attenuation in the cerebral white matter.  History of RCC w/ mets to brain, s/p WBRT in August-Sept. 2017.  No significant electrolyte abnormalities on admission. Neuro exam non-focal.   Given history of brain mets from RCC, here is high suspicion recurrent mets to brain despite CT findings. If patient did have epileptic event, it was less likely from electrolyte abnormality or febrile seizure given findings on admission.    MRI head w/ and w/o contrast (please ensure patient's prosthesis is MRI compatible)  EEG  Neuro consult in AM

## 2018-08-04 NOTE — DIETITIAN INITIAL EVALUATION ADULT. - PROBLEM SELECTOR PLAN 2
Chronic, 2/2 to RCC.  Hb 7.8 on admission. baseline around 7.4 to 8.0 per outpatient labs   Has routinely been getting transfusions at [facility]  Iron studies: low iron, low TIBC, high ferritin, suggesting anemia of chronic disease    Will transfuse 1 unit pRBC, recheck CBC after  Trend CBC

## 2018-08-04 NOTE — DIETITIAN INITIAL EVALUATION ADULT. - PROBLEM SELECTOR PLAN 5
Diagnosed July 2016, with mets to brain, lungs, femur bilaterally  s/p WBRT in August-Sept. 2017 for multiple brain mets  s/p sunitinib in in 2017  s/p nivolomab - 8 cycles  s/p repair in November 2017 for bilateral pathologic femur fractures     D/c with oncologist about further treatment plans.  Consider Palliative care consult given poor prognosis.   Supportive treatment as above.

## 2018-08-04 NOTE — PROGRESS NOTE ADULT - PROBLEM SELECTOR PLAN 5
Diagnosed July 2016, with mets to brain, lungs, femur bilaterally  s/p Whole brain radiation therapy in August-Sept. 2017 for multiple brain mets  s/p sunitinib in in 2017  s/p nivolomab - 8 cycles  s/p repair in November 2017 for bilateral pathologic femur fractures   spoke with Dr. Lawton (oncologist) who recommends palliative consult but patient not amenable  Supportive treatment as above.

## 2018-08-04 NOTE — DIETITIAN INITIAL EVALUATION ADULT. - ENERGY NEEDS
Height: 5' 10" Weight: 177.8 lbs,  lbs+/-10%, %%, BMI 22  IBW used to calculate EER as per pt p/w unintentional weight loss  Estimated nutrient needs based on [facility] SOC for repletion in adults with cancer

## 2018-08-04 NOTE — DIETITIAN INITIAL EVALUATION ADULT. - PROBLEM SELECTOR PLAN 7
1) PCP Contacted on Admission: (N) --> Name & Phone #: Dr. Garcia (oncologist) - [phone]  2) Date of Contact with PCP:  3) PCP Contacted at Discharge: (Y/N, N/A)  4) Summary of Handoff Given to PCP:   5) Post-Discharge Appointment Date and Location:

## 2018-08-04 NOTE — PHYSICAL THERAPY INITIAL EVALUATION ADULT - ADDITIONAL COMMENTS
Pt. has been mostly bed bound x several months, however was able to perform transfers with assist to chair/shower chair. Pt's tolerance of sitting has been limited due to hypotension and dizziness.

## 2018-08-05 LAB
ANION GAP SERPL CALC-SCNC: 11 MMOL/L — SIGNIFICANT CHANGE UP (ref 5–17)
BUN SERPL-MCNC: 8 MG/DL — SIGNIFICANT CHANGE UP (ref 7–23)
CALCIUM SERPL-MCNC: 8.2 MG/DL — LOW (ref 8.4–10.5)
CHLORIDE SERPL-SCNC: 99 MMOL/L — SIGNIFICANT CHANGE UP (ref 96–108)
CO2 SERPL-SCNC: 26 MMOL/L — SIGNIFICANT CHANGE UP (ref 22–31)
CREAT SERPL-MCNC: 0.54 MG/DL — SIGNIFICANT CHANGE UP (ref 0.5–1.3)
GLUCOSE SERPL-MCNC: 118 MG/DL — HIGH (ref 70–99)
HCT VFR BLD CALC: 29.4 % — LOW (ref 39–50)
HGB BLD-MCNC: 8.6 G/DL — LOW (ref 13–17)
MAGNESIUM SERPL-MCNC: 2 MG/DL — SIGNIFICANT CHANGE UP (ref 1.6–2.6)
MCHC RBC-ENTMCNC: 26.4 PG — LOW (ref 27–34)
MCHC RBC-ENTMCNC: 29.3 G/DL — LOW (ref 32–36)
MCV RBC AUTO: 90.2 FL — SIGNIFICANT CHANGE UP (ref 80–100)
PHOSPHATE SERPL-MCNC: 2.5 MG/DL — SIGNIFICANT CHANGE UP (ref 2.5–4.5)
PLATELET # BLD AUTO: 313 K/UL — SIGNIFICANT CHANGE UP (ref 150–400)
POTASSIUM SERPL-MCNC: 4 MMOL/L — SIGNIFICANT CHANGE UP (ref 3.5–5.3)
POTASSIUM SERPL-SCNC: 4 MMOL/L — SIGNIFICANT CHANGE UP (ref 3.5–5.3)
RBC # BLD: 3.26 M/UL — LOW (ref 4.2–5.8)
RBC # FLD: 15.6 % — SIGNIFICANT CHANGE UP (ref 10.3–16.9)
SODIUM SERPL-SCNC: 136 MMOL/L — SIGNIFICANT CHANGE UP (ref 135–145)
WBC # BLD: 9.2 K/UL — SIGNIFICANT CHANGE UP (ref 3.8–10.5)
WBC # FLD AUTO: 9.2 K/UL — SIGNIFICANT CHANGE UP (ref 3.8–10.5)

## 2018-08-05 PROCEDURE — 99233 SBSQ HOSP IP/OBS HIGH 50: CPT

## 2018-08-05 RX ADMIN — Medication 650 MILLIGRAM(S): at 12:30

## 2018-08-05 RX ADMIN — MORPHINE SULFATE 2 MILLIGRAM(S): 50 CAPSULE, EXTENDED RELEASE ORAL at 07:00

## 2018-08-05 RX ADMIN — Medication 650 MILLIGRAM(S): at 01:49

## 2018-08-05 RX ADMIN — MORPHINE SULFATE 2 MILLIGRAM(S): 50 CAPSULE, EXTENDED RELEASE ORAL at 07:15

## 2018-08-05 RX ADMIN — Medication 650 MILLIGRAM(S): at 13:00

## 2018-08-05 RX ADMIN — Medication 650 MILLIGRAM(S): at 00:49

## 2018-08-05 RX ADMIN — MORPHINE SULFATE 2 MILLIGRAM(S): 50 CAPSULE, EXTENDED RELEASE ORAL at 17:38

## 2018-08-05 RX ADMIN — MORPHINE SULFATE 2 MILLIGRAM(S): 50 CAPSULE, EXTENDED RELEASE ORAL at 17:23

## 2018-08-05 RX ADMIN — ENOXAPARIN SODIUM 40 MILLIGRAM(S): 100 INJECTION SUBCUTANEOUS at 12:04

## 2018-08-05 NOTE — PROGRESS NOTE ADULT - ASSESSMENT
52 y/o M w/ metastatic RCC presenting with seizure like activity, r/o seizure and possible new brain mets

## 2018-08-05 NOTE — PROGRESS NOTE ADULT - PROBLEM SELECTOR PLAN 3
Please advise  Refill per protocol Lidocaine 5% patch  Last office visit 8/25/17  Last refill 4/21/17 # 30  Last lab 4/21/17  Last /72     H&H stable after transfusion, monitor CBC

## 2018-08-05 NOTE — PROGRESS NOTE ADULT - SUBJECTIVE AND OBJECTIVE BOX
Internal Medicine Hospitalist Progress Note    Patient is a 53y old  Male who presents with a chief complaint of hypotension, seizure-like activity, metastatic RCC (02 Aug 2018 20:56)      INTERVAL HPI/OVERNIGHT EVENTS:    no twitching or seizure like activity, EEG ongoing.  no f/c, n/v/ headaches or blurry vision, no numbness/tingling of ext or weakness of ext. c/o b/l knee pain    MRI unable to be completed as pt cannot lay on flat board as it exacerbates his L hip pain    Review of Systems: 12 point review of systems otherwise negative    MEDICATIONS  (STANDING):  enoxaparin Injectable 40 milliGRAM(s) SubCutaneous daily    MEDICATIONS  (PRN):  acetaminophen   Tablet. 650 milliGRAM(s) Oral every 6 hours PRN Moderate Pain (4 - 6)  morphine  - Injectable 2 milliGRAM(s) IV Push every 6 hours PRN Severe Pain (7 - 10)  ondansetron Injectable 4 milliGRAM(s) IV Push every 6 hours PRN Nausea and/or Vomiting    Allergies    No Known Allergies    Intolerances    ICU Vital Signs Last 24 Hrs  T(C): 36.9 (05 Aug 2018 09:05), Max: 36.9 (05 Aug 2018 09:05)  T(F): 98.5 (05 Aug 2018 09:05), Max: 98.5 (05 Aug 2018 09:05)  HR: 89 (05 Aug 2018 09:05) (86 - 92)  BP: 90/59 (05 Aug 2018 09:05) (90/59 - 92/60)  RR: 16 (05 Aug 2018 09:05) (16 - 17)  SpO2: 100% (05 Aug 2018 09:05) (99% - 100%)      Physical Exam:  (at 2:30PM)  Daily Height in cm: 177.8 (03 Aug 2018 13:25)    Daily   General:  well appearing, NAD, awake, A&Ox3  HEENT:  MMM, EEG leads on scalp  CV:  RRR, no murmur   Lungs:  CTA B/L  Abdomen:  soft NT ND  Skin:  WWP  Neuro:  AAOx2-3, non-focal    LABS:                          8.6    9.2   )-----------( 313      ( 05 Aug 2018 10:44 )             29.4         CBC Full  -  ( 05 Aug 2018 10:44 )  WBC Count : 9.2 K/uL  Hemoglobin : 8.6 g/dL  Hematocrit : 29.4 %  Platelet Count - Automated : 313 K/uL  Mean Cell Volume : 90.2 fL  Mean Cell Hemoglobin : 26.4 pg  Mean Cell Hemoglobin Concentration : 29.3 g/dL      08-05    136  |  99  |  8   ----------------------------<  118<H>  4.0   |  26  |  0.54    Ca    8.2<L>      05 Aug 2018 10:44  Phos  2.5     08-05  Mg     2.0     08-05        Color: Yellow / Appearance: Clear / SG: <=1.005 / pH: x  Gluc: x / Ketone: NEGATIVE  / Bili: Negative / Urobili: 0.2 E.U./dL   Blood: x / Protein: NEGATIVE mg/dL / Nitrite: NEGATIVE   Leuk Esterase: Moderate / RBC: < 5 /HPF / WBC Many /HPF   Sq Epi: x / Non Sq Epi: 0-5 /HPF / Bacteria: Present /HPF          RADIOLOGY & ADDITIONAL TESTS:    ---------------------------------------------------------------------------  I personally reviewed: [  ]EKG   [  ]CXR    [  ] CT    [  ]Other  ---------------------------------------------------------------------------  PLEASE CHECK WHEN PRESENT:     [  ]Heart Failure     [  ] Acute     [  ] Acute on Chronic     [  ] Chronic  -------------------------------------------------------------------     [  ]Diastolic [HFpEF]     [  ]Systolic [HFrEF]     [  ]Combined [HFpEF & HFrEF]     [  ]Other:  -------------------------------------------------------------------  [  ]NATE     [  ]ATN     [  ]Reneal Medullary Necrosis     [  ]Renal Cortical Necrosis     [  ]Other Pathological Lesions:    [  ]CKD 1  [  ]CKD 2  [  ]CKD 3  [  ]CKD 4  [  ]CKD 5  [  ]Other  -------------------------------------------------------------------  [  ]Other/Unspecified:    --------------------------------------------------------------------    Abdominal Nutritional Status  [  ]Malnutrition: See Nutrition Note  [  ]Cachexia  [  ]Other:   [  ]Supplement Ordered:  [  ]Morbid Obesity (BMI >=40]

## 2018-08-05 NOTE — PROGRESS NOTE ADULT - PROBLEM SELECTOR PLAN 1
unclear etiology, but concern for seizure by hx; plan for EEG and MRI (r/o brain mets), MRI brain incomplete study due to pt inability to lay flat on board  - needs contrast to complete study. If EEG neg, will consider outpt MRI evaluation as clinical exam w/ no focal neurological deficits. may consider d/c on anti-epileptic per epilepsy recs.

## 2018-08-06 ENCOUNTER — TRANSCRIPTION ENCOUNTER (OUTPATIENT)
Age: 53
End: 2018-08-06

## 2018-08-06 DIAGNOSIS — E43 UNSPECIFIED SEVERE PROTEIN-CALORIE MALNUTRITION: ICD-10-CM

## 2018-08-06 PROCEDURE — 93306 TTE W/DOPPLER COMPLETE: CPT | Mod: 26

## 2018-08-06 PROCEDURE — 99232 SBSQ HOSP IP/OBS MODERATE 35: CPT

## 2018-08-06 PROCEDURE — 95951: CPT | Mod: 26

## 2018-08-06 RX ADMIN — MORPHINE SULFATE 2 MILLIGRAM(S): 50 CAPSULE, EXTENDED RELEASE ORAL at 22:07

## 2018-08-06 RX ADMIN — MORPHINE SULFATE 2 MILLIGRAM(S): 50 CAPSULE, EXTENDED RELEASE ORAL at 09:27

## 2018-08-06 RX ADMIN — Medication 650 MILLIGRAM(S): at 03:21

## 2018-08-06 RX ADMIN — Medication 650 MILLIGRAM(S): at 17:31

## 2018-08-06 RX ADMIN — MORPHINE SULFATE 2 MILLIGRAM(S): 50 CAPSULE, EXTENDED RELEASE ORAL at 22:42

## 2018-08-06 RX ADMIN — ENOXAPARIN SODIUM 40 MILLIGRAM(S): 100 INJECTION SUBCUTANEOUS at 11:11

## 2018-08-06 RX ADMIN — Medication 650 MILLIGRAM(S): at 18:01

## 2018-08-06 RX ADMIN — MORPHINE SULFATE 2 MILLIGRAM(S): 50 CAPSULE, EXTENDED RELEASE ORAL at 11:00

## 2018-08-06 NOTE — PROGRESS NOTE ADULT - PROBLEM SELECTOR PLAN 1
Unwitnessed episode of RUE twitching followed by LOC on Sunday, 7/28.   CT head showing nonspecific patchy areas of low attenuation in the cerebral white matter.  History of RCC w/ mets to brain, s/p WBRT in August-Sept. 2017.  No significant electrolyte abnormalities on admission. Neuro exam non-focal.   Given history of brain mets from RCC, here is high suspicion recurrent mets to brain despite CT findings. If patient did have epileptic event, it was less likely from electrolyte abnormality or febrile seizure given findings on admission   f/u MRI head w/ and w/o contrast (please ensure patient's prosthesis is MRI compatible).   f/u EEG  8/4 MRI scheduled today between 4-6pm. EEG scheduled for tomorrow morning. Unwitnessed episode of RUE twitching followed by LOC on Sunday, 7/28.   CT head showing nonspecific patchy areas of low attenuation in the cerebral white matter.  History of RCC w/ mets to brain, s/p WBRT in August-Sept. 2017.  No significant electrolyte abnormalities on admission. Neuro exam non-focal.   Given history of brain mets from RCC, here is high suspicion recurrent mets to brain despite CT findings. If patient did have epileptic event, it was less likely from electrolyte abnormality or febrile seizure given findings on admission   f/u MRI head w/ and w/o contrast (please ensure patient's prosthesis is MRI compatible).   EEG read normal as per epilepsy team   MRI can be scheduled as an outpatient Unwitnessed episode of RUE twitching followed by LOC on Sunday, 7/28.   CT head showing nonspecific patchy areas of low attenuation in the cerebral white matter.  History of RCC w/ mets to brain, s/p WBRT in August-Sept. 2017.  No significant electrolyte abnormalities on admission. Neuro exam non-focal.   Given history of brain mets from RCC, here is high suspicion recurrent mets to brain despite CT findings. If patient did have epileptic event, it was less likely from electrolyte abnormality or febrile seizure given findings on admission   f/u MRI head  w/o contrast no acute infarct. MRI w contrast couldn't be performed to be done outpatient  EEG read normal as per epilepsy team; no need for prophylaxis

## 2018-08-06 NOTE — DISCHARGE NOTE ADULT - MEDICATION SUMMARY - MEDICATIONS TO STOP TAKING
I will STOP taking the medications listed below when I get home from the hospital:    Toprol-XL 25 mg oral tablet, extended release  -- 1 tab(s) by mouth once a day I will STOP taking the medications listed below when I get home from the hospital:  None

## 2018-08-06 NOTE — PROGRESS NOTE ADULT - PROBLEM SELECTOR PLAN 4
Bilateral pathologic femur fractures in November 2017, s/p surgical repair at [facility].  Patient reports chronic hip and femur pain, especially worse in left side. Takes Tylenol at nursing home   Severe left hip and femur pain on admission, relieved w/ morphine.  Pain control: Percocet PRN  No recent fall or worsened pain of the legs   f/u xrays Bilateral pathologic femur fractures in November 2017, s/p b/l hip replacements  Patient reports chronic hip and femur pain, especially worse in left side. Takes Tylenol at nursing home   Severe left hip and femur pain on admission, relieved w/ morphine.  Pain control: Percocet PRN  No recent fall or worsened pain of the legs

## 2018-08-06 NOTE — DISCHARGE NOTE ADULT - PLAN OF CARE
To understand the reason for your stuttering and freezing episode last week You were admitted to rule out a seizure type episode that occurred 7-8 days prior to admission. You were stuttering and with your friends at the nursing home and lost consciousness for an extended period. Here we did a MRI of the brain without contrast which showed no acute issues. You could not tolerate the contrast part secondary to pain in your legs. We recommend you do an outpatient MRI with contrast to monitor your brain mets. Please follow up with Dr. Garcia who is your oncologist to manage and further surveillance To properly treat your anemia Due to your renal cell cancer you have been diagnosed with anemia which is decreased red blood cells in your body. You were given 1 unit pRBCs and monitored for further blood loss. You had no acute bleeding or loss of blood. Please follow up with Dr. Garcia who is your oncologist for management and further surveillance of your anemia. To properly treat your fracture and hip pain You were previously diagnosed with renal cell cancer with mets to the femur. You had received a b/l hip replacement in November of 2017 and have been taking Tylenol outpatient for the pain. It is worsened when you move and limits walking. Please follow up with Dr. Garcia who is your oncologist to manage and further surveillance To properly treat your hip pain To treat your high blood pressure You were previously diagnosed with high blood pressure. Please continue on your home medications at this time and follow up with your PMD for further surveillance and management of your high blood pressure. To understand why you were hypotensive You came in with low BP readings of 80s-90s. We did a full work up including an echo which allows us to see how much outflow is getting pumped out from your heart. Your ejection fraction is 45-50% which is slightly lower than normal which will just need yearly monitoring. You also were taking outpatient toprol as a heart medication. Please stop taking this medication as it may have been causing you to have increased low blood pressures at the nursing facility. Please follow up with Dr. Garcia for further management and surveillance of your hypotension. To ensure there is no new mets or expanding mets Your current diagnosis is renal cell cancer with mets to the brain, lung and femurs. You have been getting chemo with Dr. Garcia. You did an MRI without contrast here and could not tolerate laying flat for that long and an MRI with contrast was not performed secondarily to this. Please do an outpatient MRI with contrast to asses the tumors that are located in your brain. Please follow up with Dr. Garcia for surveillance and management of your cancer.

## 2018-08-06 NOTE — DISCHARGE NOTE ADULT - COMMUNITY RESOURCES
Patient being DC to [facility] Address: [address] [place] [address]  Phone: [phone] at 3 pm, trip # [id number]

## 2018-08-06 NOTE — PROGRESS NOTE ADULT - PROBLEM SELECTOR PLAN 7
1) PCP Contacted on Admission: (N) --> Name & Phone #: Dr. Garcia (oncologist) -   [phone]  2) Date of Contact with PCP:  3) PCP Contacted at Discharge: (Y/N, N/A)  4) Summary of Handoff Given to PCP:   5) Post-Discharge Appointment Date and Location:

## 2018-08-06 NOTE — EEG REPORT - NS EEG TEXT BOX
[facility] Department of Neurology  Inpatient Continuous video-Electroencephalography Report    Patient Name:	LALITA CAMPA    :	1965  MRN:	[id number]    Study Start Date/Time:  2018, 7:00:14 AM  Study End Date/Time:	    Referred by:  Dr. Maddox    Brief Clinical History:  LALITA CAMPA is a 53 year old Male  53 year old male with history of metastatic RCC (diagnosed in 2016; mets to brain, lungs, femur bilaterally), currently living in nursing home who presented  to [facility] ED from [facility] center at the urge of his oncologist after recent seizure-like episode 5 days ago admitted for syncope vs.  seizure    Diagnosis Code:   R56.9 convulsions/seizure  CPT: 47991 vEEG 12-24 hours    Pertinent Medications on Initiation        Acquisition Details:  Electroencephalography was acquired using a minimum of 21 channels on an [employer] Neurology system v 8.5.1 with electrode placement according to the standard International 10-20 system following ACNS (American Clinical Neurophysiology Society) guidelines for Long-Term Video EEG monitoring.  Anterior temporal T1 and T2 electrodes were utilized whenever possible.   The [employer]K automated spike & seizure detections were all reviewed in detail, in addition to extensive portions of raw EEG.    Day 1: 2018 @ 7:00:14 AM to next morning @ 07:00 am  Background:  continuous, with predominantly alpha and beta  frequencies.  Symmetry:  No persistent asymmetries of voltage or frequency.  Posterior Dominant Rhythm: symmetric, well-regulated 8-8.5 Hz.  Organization: normal anterior-posterior voltage-frequency gradient.  Voltage:  Normal (20+ uV)  Variability: Yes. 						  Reactivity: Yes.  N2 sleep: symmetric, synchronous sleep spindles/K-complexes.  Spontaneous Activity:  No epileptiform discharges..  Periodic/rhythmic activity:  None.  Events:  No electrographic seizures or significant clinical events.  Provocations:  Hyperventilation and Photic stimulation: was not performed.  Daily Summary:      This is a   normal continuous Video-EEG.  No    events  reported  Read by:  Justyna Milton MD

## 2018-08-06 NOTE — DISCHARGE NOTE ADULT - PATIENT PORTAL LINK FT
You can access the [facility], offered by [facility], by registering with the following website: [email or web address]

## 2018-08-06 NOTE — DISCHARGE NOTE ADULT - HOSPITAL COURSE
This is a 53 year old male with PMH RCC with mets to the brain, This is a 53 year old male with PMH RCC (2016) with mets to the brain (s/p WBRT 2017), lungs and b/l femur (s/p b/l leg and knee replacements 2017)  on chemo with Nivolumab every 2 weeks since May 2018 presented from chemo session at the urge of Dr. Garcia (oncologist). He had a seizure like episode on 7/29/18 at the nursing facility in which he resides and she was not informed about it until his chemo session later that week on 8/2/18. She immediately sent him to [facility] in which a work up for syncope vs seizure vs expanding mets was started. Patient on vEEG which was negative. MRI non contrast showed no acute infarct. Patient's only major complaints this admission have been his left leg pain in which he cannot mobilize properly due to it. MRI contrast did not be administered secondary to increased pain when lying flat. He will need this as an outpatient. Patient was not very receptive about poor prognosis with his diagnosis of RCC with mets and palliative was not consulted due to not being amenable to speaking with them. Hospital course also composed of hypotensive episodes but otherwise asymptomatic. His BP outpatient ranges from 80-90. Patient has no actively bleeding and rest of vitals have been stable. Echo revealed EF of 45-50% with trace MR. He was given 1 unit pRBCS 2/2 anemia due to RCC. His hemoglobin has been stable >8 since transfusion. Patient is stable to be discharged back to the nursing facility with follow up appointment with oncologist.

## 2018-08-06 NOTE — DISCHARGE NOTE ADULT - CARE PLAN
Principal Discharge DX:	Seizure-like activity  Goal:	To understand the reason for your stuttering and freezing episode last week  Assessment and plan of treatment:	You were admitted to rule out a seizure type episode that occurred 7-8 days prior to admission. You were stuttering and with your friends at the nursing home and lost consciousness for an extended period. Here we did a MRI of the brain without contrast which showed no acute issues. You could not tolerate the contrast part secondary to pain in your legs. We recommend you do an outpatient MRI with contrast to monitor your brain mets. Please follow up with Dr. Garcia who is your oncologist to manage and further surveillance  Secondary Diagnosis:	Anemia in neoplastic disease  Goal:	To properly treat your anemia  Assessment and plan of treatment:	Due to your renal cell cancer you have been diagnosed with anemia which is decreased red blood cells in your body. You were given 1 unit pRBCs and monitored for further blood loss. You had no acute bleeding or loss of blood. Please follow up with Dr. Garcia who is your oncologist for management and further surveillance of your anemia.  Secondary Diagnosis:	Closed fracture of femur, unspecified fracture morphology, unspecified laterality, unspecified portion of femur, sequela  Goal:	To properly treat your fracture and hip pain  Assessment and plan of treatment:	You were previously diagnosed with renal cell cancer with mets to the femur. You had received a b/l hip replacement in November of 2017 and have been taking Tylenol outpatient for the pain. It is worsened when you move and limits walking. Please follow up with Dr. Garcia who is your oncologist to manage and further surveillance  Secondary Diagnosis:	H/O total knee replacement, right  Goal:	To properly treat your hip pain  Assessment and plan of treatment:	You were previously diagnosed with renal cell cancer with mets to the femur. You had received a b/l hip replacement in November of 2017 and have been taking Tylenol outpatient for the pain. It is worsened when you move and limits walking. Please follow up with Dr. Garcia who is your oncologist to manage and further surveillance  Secondary Diagnosis:	HTN (hypertension)  Goal:	To treat your high blood pressure  Assessment and plan of treatment:	You were previously diagnosed with high blood pressure. Please continue on your home medications at this time and follow up with your PMD for further surveillance and management of your high blood pressure.  Secondary Diagnosis:	Hypotension, unspecified hypotension type  Goal:	To understand why you were hypotensive  Secondary Diagnosis:	Metastatic cancer Principal Discharge DX:	Seizure-like activity  Goal:	To understand the reason for your stuttering and freezing episode last week  Assessment and plan of treatment:	You were admitted to rule out a seizure type episode that occurred 7-8 days prior to admission. You were stuttering and with your friends at the nursing home and lost consciousness for an extended period. Here we did a MRI of the brain without contrast which showed no acute issues. You could not tolerate the contrast part secondary to pain in your legs. We recommend you do an outpatient MRI with contrast to monitor your brain mets. Please follow up with Dr. Garcia who is your oncologist to manage and further surveillance  Secondary Diagnosis:	Anemia in neoplastic disease  Goal:	To properly treat your anemia  Assessment and plan of treatment:	Due to your renal cell cancer you have been diagnosed with anemia which is decreased red blood cells in your body. You were given 1 unit pRBCs and monitored for further blood loss. You had no acute bleeding or loss of blood. Please follow up with Dr. Garcia who is your oncologist for management and further surveillance of your anemia.  Secondary Diagnosis:	Closed fracture of femur, unspecified fracture morphology, unspecified laterality, unspecified portion of femur, sequela  Goal:	To properly treat your fracture and hip pain  Assessment and plan of treatment:	You were previously diagnosed with renal cell cancer with mets to the femur. You had received a b/l hip replacement in November of 2017 and have been taking Tylenol outpatient for the pain. It is worsened when you move and limits walking. Please follow up with Dr. Garcia who is your oncologist to manage and further surveillance  Secondary Diagnosis:	H/O total knee replacement, right  Goal:	To properly treat your hip pain  Assessment and plan of treatment:	You were previously diagnosed with renal cell cancer with mets to the femur. You had received a b/l hip replacement in November of 2017 and have been taking Tylenol outpatient for the pain. It is worsened when you move and limits walking. Please follow up with Dr. Garcia who is your oncologist to manage and further surveillance  Secondary Diagnosis:	HTN (hypertension)  Goal:	To treat your high blood pressure  Assessment and plan of treatment:	You were previously diagnosed with high blood pressure. Please continue on your home medications at this time and follow up with your PMD for further surveillance and management of your high blood pressure.  Secondary Diagnosis:	Hypotension, unspecified hypotension type  Goal:	To understand why you were hypotensive  Assessment and plan of treatment:	You came in with low BP readings of 80s-90s. We did a full work up including an echo which allows us to see how much outflow is getting pumped out from your heart. Your ejection fraction is 45-50% which is slightly lower than normal which will just need yearly monitoring. You also were taking outpatient toprol as a heart medication. Please stop taking this medication as it may have been causing you to have increased low blood pressures at the nursing facility. Please follow up with Dr. Garica for further management and surveillance of your hypotension.  Secondary Diagnosis:	Metastatic cancer  Goal:	To ensure there is no new mets or expanding mets  Assessment and plan of treatment:	Your current diagnosis is renal cell cancer with mets to the brain, lung and femurs. You have been getting chemo with Dr. Garcia. You did an MRI without contrast here and could not tolerate laying flat for that long and an MRI with contrast was not performed secondarily to this. Please do an outpatient MRI with contrast to asses the tumors that are located in your brain. Please follow up with Dr. Garcia for surveillance and management of your cancer.

## 2018-08-06 NOTE — PROGRESS NOTE ADULT - SUBJECTIVE AND OBJECTIVE BOX
Patient is a 53y old  Male who presents with a chief complaint of hypotension, seizure-like activity, metastatic RCC (06 Aug 2018 10:46)      INTERVAL HPI/OVERNIGHT EVENTS:    Pt. seen and examined at 2PM  Pt. has no acute complaints  No F/C    Review of Systems: 12 point review of systems otherwise negative    MEDICATIONS  (STANDING):  enoxaparin Injectable 40 milliGRAM(s) SubCutaneous daily    MEDICATIONS  (PRN):  acetaminophen   Tablet. 650 milliGRAM(s) Oral every 6 hours PRN Moderate Pain (4 - 6)  morphine  - Injectable 2 milliGRAM(s) IV Push every 6 hours PRN Severe Pain (7 - 10)  ondansetron Injectable 4 milliGRAM(s) IV Push every 6 hours PRN Nausea and/or Vomiting      Allergies    No Known Allergies    Intolerances          Vital Signs Last 24 Hrs  T(C): 36.9 (06 Aug 2018 16:14), Max: 37 (06 Aug 2018 04:47)  T(F): 98.5 (06 Aug 2018 16:14), Max: 98.6 (06 Aug 2018 04:47)  HR: 87 (06 Aug 2018 16:14) (87 - 96)  BP: 91/60 (06 Aug 2018 16:14) (84/59 - 94/60)  BP(mean): --  RR: 17 (06 Aug 2018 16:14) (16 - 17)  SpO2: 100% (06 Aug 2018 16:14) (98% - 100%)  CAPILLARY BLOOD GLUCOSE          08-05 @ 07:01  -  08-06 @ 07:00  --------------------------------------------------------  IN: 0 mL / OUT: 300 mL / NET: -300 mL        Physical Exam:  (at 2PM)  Daily     Daily   General:  chronically-ill and cachectic appearing in NAD  HEENT:  MMM +EEG  Extremities: no edema B/L LE  Skin:  WWP  Neuro:  AAOx3    LABS:                        8.6    9.2   )-----------( 313      ( 05 Aug 2018 10:44 )             29.4     08-05    136  |  99  |  8   ----------------------------<  118<H>  4.0   |  26  |  0.54    Ca    8.2<L>      05 Aug 2018 10:44  Phos  2.5     08-05  Mg     2.0     08-05              RADIOLOGY & ADDITIONAL TESTS:    ---------------------------------------------------------------------------  I personally reviewed: [  ]EKG   [  ]CXR    [  ] CT    [  ]Other  ---------------------------------------------------------------------------  PLEASE CHECK WHEN PRESENT:     [  ]Heart Failure     [  ] Acute     [  ] Acute on Chronic     [  ] Chronic  -------------------------------------------------------------------     [  ]Diastolic [HFpEF]     [  ]Systolic [HFrEF]     [  ]Combined [HFpEF & HFrEF]     [  ]Other:  -------------------------------------------------------------------  [  ]NATE     [  ]ATN     [  ]Reneal Medullary Necrosis     [  ]Renal Cortical Necrosis     [  ]Other Pathological Lesions:    [  ]CKD 1  [  ]CKD 2  [  ]CKD 3  [  ]CKD 4  [  ]CKD 5  [  ]Other  -------------------------------------------------------------------  [  ]Other/Unspecified:    --------------------------------------------------------------------    Abdominal Nutritional Status  [  ]Malnutrition: See Nutrition Note  [  ]Cachexia  [  ]Other:   [  ]Supplement Ordered:  [  ]Morbid Obesity (BMI >=40]

## 2018-08-06 NOTE — DISCHARGE NOTE ADULT - ADDITIONAL INSTRUCTIONS
Dr Garcia appointment: Tuesday, 8/14/18 11:45AM. Dr Garcia appointment: Tuesday, 8/14/18 11:45AM. Please let Dr. Garcia know that you did not receive MRI with contrast because you were in too much pain and they only did an MRI w/o contrast. You will need an MRI with contrast done as an outpatient.

## 2018-08-06 NOTE — DISCHARGE NOTE ADULT - SECONDARY DIAGNOSIS.
Anemia in neoplastic disease Closed fracture of femur, unspecified fracture morphology, unspecified laterality, unspecified portion of femur, sequela H/O total knee replacement, right HTN (hypertension) Hypotension, unspecified hypotension type Metastatic cancer

## 2018-08-06 NOTE — DISCHARGE NOTE ADULT - MEDICATION SUMMARY - MEDICATIONS TO TAKE
I will START or STAY ON the medications listed below when I get home from the hospital:    HYDROmorphone 4 mg oral tablet  -- 1 tab(s) by mouth every 6 hours, As Needed, Moderate Pain (4 - 6) MDD:4  -- Indication: For Pain of both hip joints    Toprol-XL 25 mg oral tablet, extended release  -- 1 tab(s) by mouth once a day  -- Indication: For Hypertension    Sutent 50 mg oral capsule  -- 1 cap(s) by mouth once a day  -- Indication: For Metastatic cancer    Flexeril  -- 10 milligram(s) by mouth every 8 hours, As Needed  -- Indication: For Pain of both hip joints I will START or STAY ON the medications listed below when I get home from the hospital:    HYDROmorphone 4 mg oral tablet  -- 1 tab(s) by mouth every 6 hours, As Needed, Moderate Pain (4 - 6) MDD:4  -- Indication: For Pain of both hip joints    Sutent 50 mg oral capsule  -- 1 cap(s) by mouth once a day  -- Indication: For Metastatic cancer    Flexeril  -- 10 milligram(s) by mouth every 8 hours, As Needed  -- Indication: For Pain of both hip joints

## 2018-08-06 NOTE — PROGRESS NOTE ADULT - PROBLEM SELECTOR PLAN 1
unclear etiology, no e/o epilepsy on EEG, no e/o significant valvular dz on TTE; orthostatics negative; possible vasovagal episode? cont. supportive care, oral hydration encouraged

## 2018-08-06 NOTE — PROGRESS NOTE ADULT - SUBJECTIVE AND OBJECTIVE BOX
OVERNIGHT EVENTS: DIANA, no seizure like activity.     SUBJECTIVE:    Vital Signs Last 12 Hrs  T(F): 98.6 (08-06-18 @ 04:47), Max: 98.6 (08-06-18 @ 04:47)  HR: 90 (08-06-18 @ 04:47) (90 - 96)  BP: 88/64 (08-06-18 @ 04:47) (84/59 - 88/64)  BP(mean): --  RR: 17 (08-06-18 @ 04:47) (16 - 17)  SpO2: 98% (08-06-18 @ 04:47) (98% - 99%)  I&O's Summary    05 Aug 2018 07:01  -  06 Aug 2018 07:00  --------------------------------------------------------  IN: 0 mL / OUT: 300 mL / NET: -300 mL        PHYSICAL EXAM:  Constitutional: NAD, comfortable in bed.  HEENT: NC/AT, PERRLA, EOMI, no conjunctival pallor or scleral icterus, MMM  Neck: Supple, no JVD  Respiratory: Normal rate, rhythm, depth, effort. CTAB. No w/r/r.   Cardiovascular: RRR, normal S1 and S2, no m/r/g.   Gastrointestinal: +BS, soft NTND, no guarding or rebound tenderness, no palpable masses   Extremities: wwp; no cyanosis, clubbing or edema.   Vascular: Pulses equal and strong throughout.   Neurological: AAOx3, no CN deficits, strength and sensation intact throughout.   Skin: No gross skin abnormalities or rashes        LABS:                        8.6    9.2   )-----------( 313      ( 05 Aug 2018 10:44 )             29.4     08-05    136  |  99  |  8   ----------------------------<  118<H>  4.0   |  26  |  0.54    Ca    8.2<L>      05 Aug 2018 10:44  Phos  2.5     08-05  Mg     2.0     08-05            RADIOLOGY & ADDITIONAL TESTS:    MEDICATIONS  (STANDING):  enoxaparin Injectable 40 milliGRAM(s) SubCutaneous daily    MEDICATIONS  (PRN):  acetaminophen   Tablet. 650 milliGRAM(s) Oral every 6 hours PRN Moderate Pain (4 - 6)  morphine  - Injectable 2 milliGRAM(s) IV Push every 6 hours PRN Severe Pain (7 - 10)  ondansetron Injectable 4 milliGRAM(s) IV Push every 6 hours PRN Nausea and/or Vomiting OVERNIGHT EVENTS: DIANA, no seizure like activity.     SUBJECTIVE: Mild left hip pain that is unchanged from prior days. Otherwise no chest pain, shortness of breath, fever/chills, no episodes of loc.     Vital Signs Last 12 Hrs  T(F): 98.6 (08-06-18 @ 04:47), Max: 98.6 (08-06-18 @ 04:47)  HR: 90 (08-06-18 @ 04:47) (90 - 96)  BP: 88/64 (08-06-18 @ 04:47) (84/59 - 88/64)  BP(mean): --  RR: 17 (08-06-18 @ 04:47) (16 - 17)  SpO2: 98% (08-06-18 @ 04:47) (98% - 99%)  I&O's Summary    05 Aug 2018 07:01  -  06 Aug 2018 07:00  --------------------------------------------------------  IN: 0 mL / OUT: 300 mL / NET: -300 mL        PHYSICAL EXAM:  Constitutional: NAD, comfortable in bed.  HEENT: NC/AT, PERRLA, EOMI, no conjunctival pallor or scleral icterus, MMM  Neck: Supple, no JVD  Respiratory: Normal rate, rhythm, depth, effort. CTAB. No w/r/r.   Cardiovascular: RRR, normal S1 and S2, no m/r/g.   Gastrointestinal: +BS, soft NTND, no guarding or rebound tenderness, no palpable masses   Extremities: wwp; no cyanosis, clubbing or edema.   Vascular: Pulses equal and strong throughout.   Neurological: AAOx3, no CN deficits, strength in the b/l lower extremities limited 2/2 pain, hip abduction and adduction intact b/l;  inability to leg raise    Skin: No gross skin abnormalities or rashes        LABS:                        8.6    9.2   )-----------( 313      ( 05 Aug 2018 10:44 )             29.4     08-05    136  |  99  |  8   ----------------------------<  118<H>  4.0   |  26  |  0.54    Ca    8.2<L>      05 Aug 2018 10:44  Phos  2.5     08-05  Mg     2.0     08-05            RADIOLOGY & ADDITIONAL TESTS:    MEDICATIONS  (STANDING):  enoxaparin Injectable 40 milliGRAM(s) SubCutaneous daily    MEDICATIONS  (PRN):  acetaminophen   Tablet. 650 milliGRAM(s) Oral every 6 hours PRN Moderate Pain (4 - 6)  morphine  - Injectable 2 milliGRAM(s) IV Push every 6 hours PRN Severe Pain (7 - 10)  ondansetron Injectable 4 milliGRAM(s) IV Push every 6 hours PRN Nausea and/or Vomiting

## 2018-08-06 NOTE — PROGRESS NOTE ADULT - ASSESSMENT
53 year old male with history of metastatic RCC (diagnosed in 2016; mets to brain, lungs, femur bilaterally), currently living in nursing home who present to [facility] ED from [facility] center at the urge of his oncologist after recent seizure-like episode 5 days ago admitted for syncopal work up.

## 2018-08-06 NOTE — DISCHARGE NOTE ADULT - CARE PROVIDER_API CALL
Shiraz Garcia), Internal Medicine; Medical Oncology  [address] [place] [address]  Phone: [phone]  Fax: [phone]

## 2018-08-06 NOTE — PROGRESS NOTE ADULT - PROBLEM SELECTOR PLAN 3
BP 80s-90s in the ER but this is his baseline   No signs of sepsis at this time: patient afebrile, WBC WNL  continue to monitor BP   f/u orthostatics BP 80s-90s in the ER but this is his baseline   No signs of sepsis at this time: patient afebrile, WBC WNL  continue to monitor BP

## 2018-08-07 VITALS
HEART RATE: 100 BPM | DIASTOLIC BLOOD PRESSURE: 59 MMHG | TEMPERATURE: 98 F | SYSTOLIC BLOOD PRESSURE: 91 MMHG | OXYGEN SATURATION: 100 % | RESPIRATION RATE: 18 BRPM

## 2018-08-07 PROBLEM — C64.1 MALIGNANT NEOPLASM OF RIGHT KIDNEY, EXCEPT RENAL PELVIS: Chronic | Status: ACTIVE | Noted: 2018-08-02

## 2018-08-07 PROBLEM — S72.90XA UNSPECIFIED FRACTURE OF UNSPECIFIED FEMUR, INITIAL ENCOUNTER FOR CLOSED FRACTURE: Chronic | Status: ACTIVE | Noted: 2018-08-02

## 2018-08-07 LAB
ANION GAP SERPL CALC-SCNC: 14 MMOL/L — SIGNIFICANT CHANGE UP (ref 5–17)
BUN SERPL-MCNC: 10 MG/DL — SIGNIFICANT CHANGE UP (ref 7–23)
CALCIUM SERPL-MCNC: 8.7 MG/DL — SIGNIFICANT CHANGE UP (ref 8.4–10.5)
CHLORIDE SERPL-SCNC: 96 MMOL/L — SIGNIFICANT CHANGE UP (ref 96–108)
CO2 SERPL-SCNC: 26 MMOL/L — SIGNIFICANT CHANGE UP (ref 22–31)
CREAT SERPL-MCNC: 0.56 MG/DL — SIGNIFICANT CHANGE UP (ref 0.5–1.3)
GLUCOSE SERPL-MCNC: 84 MG/DL — SIGNIFICANT CHANGE UP (ref 70–99)
HCT VFR BLD CALC: 28.8 % — LOW (ref 39–50)
HGB BLD-MCNC: 8.6 G/DL — LOW (ref 13–17)
MCHC RBC-ENTMCNC: 26.7 PG — LOW (ref 27–34)
MCHC RBC-ENTMCNC: 29.9 G/DL — LOW (ref 32–36)
MCV RBC AUTO: 89.4 FL — SIGNIFICANT CHANGE UP (ref 80–100)
PLATELET # BLD AUTO: 381 K/UL — SIGNIFICANT CHANGE UP (ref 150–400)
POTASSIUM SERPL-MCNC: 4.2 MMOL/L — SIGNIFICANT CHANGE UP (ref 3.5–5.3)
POTASSIUM SERPL-SCNC: 4.2 MMOL/L — SIGNIFICANT CHANGE UP (ref 3.5–5.3)
RBC # BLD: 3.22 M/UL — LOW (ref 4.2–5.8)
RBC # FLD: 15.4 % — SIGNIFICANT CHANGE UP (ref 10.3–16.9)
SODIUM SERPL-SCNC: 136 MMOL/L — SIGNIFICANT CHANGE UP (ref 135–145)
WBC # BLD: 10 K/UL — SIGNIFICANT CHANGE UP (ref 3.8–10.5)
WBC # FLD AUTO: 10 K/UL — SIGNIFICANT CHANGE UP (ref 3.8–10.5)

## 2018-08-07 PROCEDURE — 96374 THER/PROPH/DIAG INJ IV PUSH: CPT

## 2018-08-07 PROCEDURE — 36430 TRANSFUSION BLD/BLD COMPNT: CPT

## 2018-08-07 PROCEDURE — 85730 THROMBOPLASTIN TIME PARTIAL: CPT

## 2018-08-07 PROCEDURE — 99285 EMERGENCY DEPT VISIT HI MDM: CPT | Mod: 25

## 2018-08-07 PROCEDURE — 97161 PT EVAL LOW COMPLEX 20 MIN: CPT

## 2018-08-07 PROCEDURE — 99238 HOSP IP/OBS DSCHRG MGMT 30/<: CPT

## 2018-08-07 PROCEDURE — 86850 RBC ANTIBODY SCREEN: CPT

## 2018-08-07 PROCEDURE — 85025 COMPLETE CBC W/AUTO DIFF WBC: CPT

## 2018-08-07 PROCEDURE — 80048 BASIC METABOLIC PNL TOTAL CA: CPT

## 2018-08-07 PROCEDURE — 86900 BLOOD TYPING SEROLOGIC ABO: CPT

## 2018-08-07 PROCEDURE — 83735 ASSAY OF MAGNESIUM: CPT

## 2018-08-07 PROCEDURE — 85610 PROTHROMBIN TIME: CPT

## 2018-08-07 PROCEDURE — 81001 URINALYSIS AUTO W/SCOPE: CPT

## 2018-08-07 PROCEDURE — 80053 COMPREHEN METABOLIC PANEL: CPT

## 2018-08-07 PROCEDURE — 71045 X-RAY EXAM CHEST 1 VIEW: CPT

## 2018-08-07 PROCEDURE — 82728 ASSAY OF FERRITIN: CPT

## 2018-08-07 PROCEDURE — 36415 COLL VENOUS BLD VENIPUNCTURE: CPT

## 2018-08-07 PROCEDURE — P9016: CPT

## 2018-08-07 PROCEDURE — 84100 ASSAY OF PHOSPHORUS: CPT

## 2018-08-07 PROCEDURE — 86923 COMPATIBILITY TEST ELECTRIC: CPT

## 2018-08-07 PROCEDURE — 70450 CT HEAD/BRAIN W/O DYE: CPT

## 2018-08-07 PROCEDURE — 85027 COMPLETE CBC AUTOMATED: CPT

## 2018-08-07 PROCEDURE — 70551 MRI BRAIN STEM W/O DYE: CPT

## 2018-08-07 PROCEDURE — 93306 TTE W/DOPPLER COMPLETE: CPT

## 2018-08-07 PROCEDURE — 83550 IRON BINDING TEST: CPT

## 2018-08-07 PROCEDURE — 84443 ASSAY THYROID STIM HORMONE: CPT

## 2018-08-07 PROCEDURE — 86901 BLOOD TYPING SEROLOGIC RH(D): CPT

## 2018-08-07 RX ADMIN — MORPHINE SULFATE 2 MILLIGRAM(S): 50 CAPSULE, EXTENDED RELEASE ORAL at 06:28

## 2018-08-07 RX ADMIN — MORPHINE SULFATE 2 MILLIGRAM(S): 50 CAPSULE, EXTENDED RELEASE ORAL at 07:02

## 2018-08-07 RX ADMIN — ENOXAPARIN SODIUM 40 MILLIGRAM(S): 100 INJECTION SUBCUTANEOUS at 11:22

## 2018-08-07 RX ADMIN — Medication 650 MILLIGRAM(S): at 12:00

## 2018-08-07 RX ADMIN — Medication 650 MILLIGRAM(S): at 11:25

## 2018-08-07 NOTE — PROGRESS NOTE ADULT - PROBLEM SELECTOR PROBLEM 5
Alkaline phosphatase elevation
Alkaline phosphatase elevation
Hypoalbuminemia
Hypoalbuminemia
Renal cell carcinoma of right kidney

## 2018-08-07 NOTE — PROGRESS NOTE ADULT - PROBLEM SELECTOR PLAN 1
unclear etiology, no e/o epilepsy on EEG, no e/o significant valvular dz on TTE; orthostatics negative; possible vasovagal episode? no further episodes while hospitalized; cont. supportive care, oral hydration encouraged

## 2018-08-07 NOTE — PROGRESS NOTE ADULT - ATTENDING COMMENTS
Dispo: medically-clear for d/c back to NH
Dispo: medically-clear for d/c back to NH
Dispo: return to facility pending further work-up  Full Code - GOC discussions ongoing
Dispo: return to facility pending further work-up  Full Code - GOC discussions ongoing
Pt seen and examined at bedside. Labs and imaging reviewed. Agree with assessment and plan above.    Assessment and plan:    Seizure- like activity- CT w/ suspicious findings, plan for MRI w/wo contrast, EEG monitoring ongoing- f/u eval. may need neuro eval.  RCC- original dx 2016, metastatic, s/p chemo WBRT to brain met  -normocytic anemia- chronic, s/p 1 unit pRBC transfusion H&H stable.  Fracture- chronic, s/p B/L hip replacements d/t pathologic fractures, per Pt.; f/u hip and knee x-rays, cont. pain control (I-STOP reviewed - on oxycodone).  Hypotension- stable, at baseline. no symptoms.

## 2018-08-07 NOTE — PROGRESS NOTE ADULT - PROBLEM SELECTOR PROBLEM 3
Anemia in neoplastic disease
Hypotension, unspecified hypotension type

## 2018-08-07 NOTE — PROGRESS NOTE ADULT - PROBLEM SELECTOR PLAN 4
chronic, s/p B/L hip replacements d/t pathologic fractures, per Pt.; cont. pain control (I-STOP reviewed - on oxycodone)

## 2018-08-07 NOTE — PROGRESS NOTE ADULT - PROBLEM SELECTOR PROBLEM 4
Pain of both hip joints
Closed fracture of femur, unspecified fracture morphology, unspecified laterality, unspecified portion of femur, sequela
Pain of both hip joints

## 2018-08-07 NOTE — PROGRESS NOTE ADULT - PROBLEM SELECTOR PLAN 6
Nutrition consult
F: s/p 1 L NS and 1 unit pRBCs. Will get more fluids if hypotensive  E: no significant electrolyte abnormalities  N: regular diet; consult nutrition services     Code: Full  DVT ppx: enoxaparin SQ  GI ppx: pantoprazole  Dispo: admit to F
Nutrition consult
likely d/t bone mets; monitor AP
likely d/t bone mets; monitor AP

## 2018-08-07 NOTE — PROGRESS NOTE ADULT - PROVIDER SPECIALTY LIST ADULT
Hospitalist
Internal Medicine
Hospitalist

## 2018-08-07 NOTE — PROGRESS NOTE ADULT - PROBLEM SELECTOR PROBLEM 2
Renal cell carcinoma, unspecified laterality
Anemia, unspecified type
Renal cell carcinoma, unspecified laterality

## 2018-08-07 NOTE — PROGRESS NOTE ADULT - SUBJECTIVE AND OBJECTIVE BOX
Patient is a 53y old  Male who presents with a chief complaint of hypotension, seizure-like activity, metastatic RCC (06 Aug 2018 10:46)      INTERVAL HPI/OVERNIGHT EVENTS:    Pt. seen and examined at 11:15AM  No acute complaints  Chronic hip pain controlled w/ Tylenol     Review of Systems: 12 point review of systems otherwise negative    MEDICATIONS  (STANDING):  enoxaparin Injectable 40 milliGRAM(s) SubCutaneous daily    MEDICATIONS  (PRN):  acetaminophen   Tablet. 650 milliGRAM(s) Oral every 6 hours PRN Moderate Pain (4 - 6)  morphine  - Injectable 2 milliGRAM(s) IV Push every 6 hours PRN Severe Pain (7 - 10)  ondansetron Injectable 4 milliGRAM(s) IV Push every 6 hours PRN Nausea and/or Vomiting      Allergies    No Known Allergies    Intolerances          Vital Signs Last 24 Hrs  T(C): 36.9 (07 Aug 2018 15:29), Max: 37.2 (07 Aug 2018 04:42)  T(F): 98.5 (07 Aug 2018 15:29), Max: 98.9 (07 Aug 2018 04:42)  HR: 100 (07 Aug 2018 15:29) (97 - 100)  BP: 91/59 (07 Aug 2018 15:29) (85/59 - 98/63)  BP(mean): --  RR: 18 (07 Aug 2018 15:29) (17 - 19)  SpO2: 100% (07 Aug 2018 15:29) (100% - 100%)  CAPILLARY BLOOD GLUCOSE          08-06 @ 07:01  -  08-07 @ 07:00  --------------------------------------------------------  IN: 0 mL / OUT: 600 mL / NET: -600 mL        Physical Exam:  (at 11:15AM)  Daily     Daily   General:  cachectic   HEENT MMM  Neuro:  AAOx3    LABS:                        8.6    10.0  )-----------( 381      ( 07 Aug 2018 07:10 )             28.8     08-07    136  |  96  |  10  ----------------------------<  84  4.2   |  26  |  0.56    Ca    8.7      07 Aug 2018 07:10              RADIOLOGY & ADDITIONAL TESTS:    ---------------------------------------------------------------------------  I personally reviewed: [  ]EKG   [  ]CXR    [  ] CT    [  ]Other  ---------------------------------------------------------------------------  PLEASE CHECK WHEN PRESENT:     [  ]Heart Failure     [  ] Acute     [  ] Acute on Chronic     [  ] Chronic  -------------------------------------------------------------------     [  ]Diastolic [HFpEF]     [  ]Systolic [HFrEF]     [  ]Combined [HFpEF & HFrEF]     [  ]Other:  -------------------------------------------------------------------  [  ]NATE     [  ]ATN     [  ]Reneal Medullary Necrosis     [  ]Renal Cortical Necrosis     [  ]Other Pathological Lesions:    [  ]CKD 1  [  ]CKD 2  [  ]CKD 3  [  ]CKD 4  [  ]CKD 5  [  ]Other  -------------------------------------------------------------------  [  ]Other/Unspecified:    --------------------------------------------------------------------    Abdominal Nutritional Status  [  ]Malnutrition: See Nutrition Note  [  ]Cachexia  [  ]Other:   [  ]Supplement Ordered:  [  ]Morbid Obesity (BMI >=40]

## 2018-08-07 NOTE — PROGRESS NOTE ADULT - PROBLEM SELECTOR PROBLEM 1
Seizure-like activity

## 2018-08-07 NOTE — PROGRESS NOTE ADULT - PROBLEM SELECTOR PROBLEM 6
Severe protein-calorie malnutrition
Alkaline phosphatase elevation
Alkaline phosphatase elevation
Nutrition, metabolism, and development symptoms
Severe protein-calorie malnutrition

## 2018-08-13 DIAGNOSIS — C64.9 MALIGNANT NEOPLASM OF UNSPECIFIED KIDNEY, EXCEPT RENAL PELVIS: ICD-10-CM

## 2018-08-13 DIAGNOSIS — D63.0 ANEMIA IN NEOPLASTIC DISEASE: ICD-10-CM

## 2018-08-13 DIAGNOSIS — I95.9 HYPOTENSION, UNSPECIFIED: ICD-10-CM

## 2018-08-13 DIAGNOSIS — C79.52 SECONDARY MALIGNANT NEOPLASM OF BONE MARROW: ICD-10-CM

## 2018-08-13 DIAGNOSIS — E43 UNSPECIFIED SEVERE PROTEIN-CALORIE MALNUTRITION: ICD-10-CM

## 2018-08-13 DIAGNOSIS — R56.9 UNSPECIFIED CONVULSIONS: ICD-10-CM

## 2018-08-13 DIAGNOSIS — I10 ESSENTIAL (PRIMARY) HYPERTENSION: ICD-10-CM

## 2018-08-13 DIAGNOSIS — Z96.651 PRESENCE OF RIGHT ARTIFICIAL KNEE JOINT: ICD-10-CM

## 2018-08-13 DIAGNOSIS — C79.31 SECONDARY MALIGNANT NEOPLASM OF BRAIN: ICD-10-CM

## 2018-08-13 DIAGNOSIS — E88.09 OTHER DISORDERS OF PLASMA-PROTEIN METABOLISM, NOT ELSEWHERE CLASSIFIED: ICD-10-CM

## 2018-08-13 DIAGNOSIS — C78.00 SECONDARY MALIGNANT NEOPLASM OF UNSPECIFIED LUNG: ICD-10-CM

## 2018-08-13 DIAGNOSIS — Z96.643 PRESENCE OF ARTIFICIAL HIP JOINT, BILATERAL: ICD-10-CM

## 2018-08-14 ENCOUNTER — APPOINTMENT (OUTPATIENT)
Dept: CT IMAGING | Facility: HOSPITAL | Age: 53
End: 2018-08-14

## 2019-10-25 NOTE — PROGRESS NOTE ADULT - PROBLEM SELECTOR PLAN 2
metastatic to bone, lung, brain; s/p chemotx and RT; has outpatient Heme-Onc f/u
Chronic, 2/2 to RCC.  Hb 7.8 on admission. baseline around 7.4 to 8.0 per outpatient labs   Has routinely been getting transfusions at [facility]  Iron studies: low iron, low TIBC, high ferritin, suggesting anemia of chronic disease  s/p 1 unit pRBCs and Hgb went up appropriately from 7.8 to 8.8.
Chronic, 2/2 to RCC.  Hb 7.8 on admission. baseline around 7.4 to 8.0 per outpatient labs   Has routinely been getting transfusions at [facility]  Iron studies: low iron, low TIBC, high ferritin, suggesting anemia of chronic disease  s/p 1 unit pRBCs and Hgb went up appropriately from 7.8 to 8.8.
Chronic, 2/2 to RCC.  Hb 7.8 on admission. baseline around 7.4 to 8.0 per outpatient labs   Has routinely been getting transfusions at [facility]  Iron studies: low iron, low TIBC, high ferritin, suggesting anemia of chronic disease  s/p 1 unit pRBCs and Hgb went up appropriately from 7.8 to 8.8.
metastatic to bone and lungs, per Pt.; need collateral info from outpatient oncologist
metastatic to bone and lungs, per Pt.; need collateral info from outpatient oncologist. check LDH
metastatic to bone, lung, brain; s/p chemotx and RT; has outpatient Heme-Onc f/u
0

## 2021-02-02 NOTE — ED ADULT NURSE NOTE - NS ED NURSE RECORD ANOTHER VITAL SIGN
Airway  Performed by: Mayo Cuadra MD  Authorized by: Mayo Cuadra MD     Final Airway Type:  Endotracheal airway  Final Endotracheal Airway*:  ETT  ETT Size (mm)*:  7.5  Cuff*:  Regular  Technique Used for Successful ETT Placement:  Direct laryngoscopy  Devices/Methods Used in Placement*:  Mask and Bag Valve  Intubation Procedure*:  Preoxygenation, ETCO2, Atraumatic, Dentition Unchanged and Phaynx Clear  Insertion Site:  Oral  Blade Type*:  MAC  Blade Size*:  3  Measured from*:  Lips  Secured at (cm)*:  23  Placement Verified by: auscultation, capnometry and equal breath sounds    Glottic View*:  2 - partial view of glottis  Attempts*:  1  Ventilation Between Attempts:  Bag valve  Number of Other Approaches Attempted:  0   Patient Identified, Procedure confirmed, Emergency equipment available and Safety protocols followed  Location:  OR  Urgency:  Elective  Difficult Airway: No    Indications for Airway Management:  Anesthesia  Spontaneous Ventilation: absent    Sedation Level:  Anesthetized  Mask Difficulty Assessment:  1 - vent by mask  Performed By:  Anesthesiologist  Anesthesiologist:  Mayo Cuadra MD  Start Time:  2/2/2021 7:30 AM   Pre O2, easy mask, DL x1, VC minimally visualized, ETT x1, easy, atraumatic.         Yes

## 2021-07-29 NOTE — PATIENT PROFILE ADULT. - TYPE OF ADMISSION, PATIENT PROFILE
De Quervain's Tenosynovitis    De Quervain's tenosynovitis is a condition that causes inflammation of the tendon on the thumb side of the wrist. Tendons are cords of tissue that connect bones to muscles. The tendons in the hand pass through a tunnel called a sheath. A slippery layer of tissue (synovium) lets the tendons move smoothly in the sheath. With de Quervain's tenosynovitis, the sheath swells or thickens, causing friction and pain.  The condition is also called de Quervain's disease and de Quervain's syndrome. It occurs most often in women who are 30-50 years old.  What are the causes?  The exact cause of this condition is not known. It may be associated with overuse of the hand and wrist.  What increases the risk?  You are more likely to develop this condition if you:  · Use your hands far more than normal, especially if you repeat certain movements that involve twisting your hand or using a tight .  · Are pregnant.  · Are a middle-aged woman.  · Have rheumatoid arthritis.  · Have diabetes.  What are the signs or symptoms?  The main symptom of this condition is pain on the thumb side of the wrist. The pain may get worse when you grasp something or turn your wrist. Other symptoms may include:  · Pain that extends up the forearm.  · Swelling of your wrist and hand.  · Trouble moving the thumb and wrist.  · A sensation of snapping in the wrist.  · A bump filled with fluid (cyst) in the area of the pain.  How is this diagnosed?  This condition may be diagnosed based on:  · Your symptoms and medical history.  · A physical exam. During the exam, your health care provider may do a simple test (Finkelstein test) that involves pulling your thumb and wrist to see if this causes pain.  You may also need to have an X-ray.  How is this treated?  Treatment for this condition may include:  · Avoiding any activity that causes pain and swelling.  · Taking medicines. Anti-inflammatory medicines and corticosteroid  injections may be used to reduce inflammation and relieve pain.  · Wearing a splint.  · Having surgery. This may be needed if other treatments do not work.  Once the pain and swelling has gone down:  · Physical therapy. This includes stretching and strengthening exercises.  · Occupational therapy. This includes adjusting how you move your wrist.  Follow these instructions at home:  If you have a splint:  · Wear the splint as told by your health care provider. Remove it only as told by your health care provider.  · Loosen the splint if your fingers tingle, become numb, or turn cold and blue.  · Keep the splint clean.  · If the splint is not waterproof:  ? Do not let it get wet.  ? Cover it with a watertight covering when you take a bath or a shower.  Managing pain, stiffness, and swelling    · Avoid movements and activities that cause pain and swelling in the wrist area.  · If directed, put ice on the painful area. This may be helpful after doing activities that involve the sore wrist.  ? Put ice in a plastic bag.  ? Place a towel between your skin and the bag.  ? Leave the ice on for 20 minutes, 2-3 times a day.  · Move your fingers often to avoid stiffness and to lessen swelling.  · Raise (elevate) the injured area above the level of your heart while you are sitting or lying down.  General instructions  · Return to your normal activities as told by your health care provider. Ask your health care provider what activities are safe for you.  · Take over-the-counter and prescription medicines only as told by your health care provider.  · Keep all follow-up visits as told by your health care provider. This is important.  Contact a health care provider if:  · Your pain medicine does not help.  · Your pain gets worse.  · You develop new symptoms.  Summary  · De Quervain's tenosynovitis is a condition that causes inflammation of the tendon on the thumb side of the wrist.  · The condition occurs most often in women who are  30-50 years old.  · The exact cause of this condition is not known. It may be associated with overuse of the hand and wrist.  · Treatment starts with avoiding activity that causes pain or swelling in the wrist area. Other treatment may include wearing a splint and taking medicine. Sometimes, surgery is needed.  This information is not intended to replace advice given to you by your health care provider. Make sure you discuss any questions you have with your health care provider.  Document Released: 09/12/2002 Document Revised: 06/20/2019 Document Reviewed: 11/26/2018  [employer] Patient Education © 2020 [employer]            Ganglion Cyst    A ganglion cyst is a non-cancerous, fluid-filled lump that occurs near a joint or tendon. The cyst grows out of a joint or the lining of a tendon. Ganglion cysts most often develop in the hand or wrist, but they can also develop in the shoulder, elbow, hip, knee, ankle, or foot.  Ganglion cysts are ball-shaped or egg-shaped. Their size can range from the size of a pea to larger than a grape. Increased activity may cause the cyst to get bigger because more fluid starts to build up.  What are the causes?  The exact cause of this condition is not known, but it may be related to:  · Inflammation or irritation around the joint.  · An injury.  · Repetitive movements or overuse.  · Arthritis.  What increases the risk?  You are more likely to develop this condition if:  · You are a woman.  · You are 15-40 years old.  What are the signs or symptoms?  The main symptom of this condition is a lump. It most often appears on the hand or wrist. In many cases, there are no other symptoms, but a cyst can sometimes cause:  · Tingling.  · Pain.  · Numbness.  · Muscle weakness.  · Weak .  · Less range of motion in a joint.  How is this diagnosed?  Ganglion cysts are usually diagnosed based on a physical exam. Your health care provider will feel the lump and may shine a light next to it. If it  is a ganglion cyst, the light will likely shine through it.  Your health care provider may order an X-ray, ultrasound, or MRI to rule out other conditions.  How is this treated?  Ganglion cysts often go away on their own without treatment. If you have pain or other symptoms, treatment may be needed. Treatment is also needed if the ganglion cyst limits your movement or if it gets infected. Treatment may include:  · Wearing a brace or splint on your wrist or finger.  · Taking anti-inflammatory medicine.  · Having fluid drained from the lump with a needle (aspiration).  · Getting a steroid injected into the joint.  · Having surgery to remove the ganglion cyst.  · Placing a pad on your shoe or wearing shoes that will not rub against the cyst if it is on your foot.  Follow these instructions at home:  · Do not press on the ganglion cyst, poke it with a needle, or hit it.  · Take over-the-counter and prescription medicines only as told by your health care provider.  · If you have a brace or splint:  ? Wear it as told by your health care provider.  ? Remove it as told by your health care provider. Ask if you need to remove it when you take a shower or a bath.  · Watch your ganglion cyst for any changes.  · Keep all follow-up visits as told by your health care provider. This is important.  Contact a health care provider if:  · Your ganglion cyst becomes larger or more painful.  · You have pus coming from the lump.  · You have weakness or numbness in the affected area.  · You have a fever or chills.  Get help right away if:  · You have a fever and have any of these in the cyst area:  ? Increased redness.  ? Red streaks.  ? Swelling.  Summary  · A ganglion cyst is a non-cancerous, fluid-filled lump that occurs near a joint or tendon.  · Ganglion cysts most often develop in the hand or wrist, but they can also develop in the shoulder, elbow, hip, knee, ankle, or foot.  · Ganglion cysts often go away on their own without  treatment.  This information is not intended to replace advice given to you by your health care provider. Make sure you discuss any questions you have with your health care provider.  Document Released: 12/15/2001 Document Revised: 11/30/2018 Document Reviewed: 08/17/2018  [employer] Patient Education © 2020 [employer]     Emergent/ED

## 2022-07-05 NOTE — ED PROVIDER NOTE - EYES NEGATIVE STATEMENT, MLM
May use PEG tube today in 6 hours. Bolster mild compression to skin wall, clean wound with peroxide and water and apply triple antibiotic cream twice a day. no discharge, no irritation, no pain, no redness, and no visual changes.

## 2022-09-14 NOTE — ED ADULT NURSE NOTE - CARDIO ASSESSMENT
Spoke to indira in the OR. Indira contacted the anesthesiologist, who OK oral meds with a small sip of water. No anticoagulants to be administered.    ---

## 2022-11-30 NOTE — DISCHARGE NOTE ADULT - CARE PROVIDERS DIRECT ADDRESSES
Rinvoq Pregnancy And Lactation Text: Based on animal studies, Rinvoq may cause embryo-fetal harm when administered to pregnant women.  The medication should not be used in pregnancy.  Breastfeeding is not recommended during treatment and for 6 days after the last dose. ,[email or web address]

## 2023-04-13 NOTE — DISCHARGE NOTE ADULT - MEDICATION SUMMARY - MEDICATIONS TO CHANGE
I will SWITCH the dose or number of times a day I take the medications listed below when I get home from the hospital:  None
197.9

## 2024-04-10 NOTE — PHYSICAL THERAPY INITIAL EVALUATION ADULT - LEVEL OF INDEPENDENCE: STAND/SIT, REHAB EVAL
Patient is here today to participate in a Continuous Glucose Monitoring Study.  Patient will wear a Dexcom for 7 days in a blinded study.  Patient will be provided with a Dexcom sensor and transmitter and a copy of the Glucose Monitoring Patient Log to fill out during the study.  A detailed explanation of Continuous Glucose Monitoring was provided.   Instructed patient to record meals, drinks,  snacks, activity, and all diabetes medications on glucose log.  Site for insertion was selected and prepared with a sterile alcohol swab and allowed to dry. Glucose Sensor Serial Number [id number] was inserted to abdomen.  Insertion of sensor done in clinic, individually, in private. Time: 15 minutes   
TBA/unable to perform